# Patient Record
Sex: FEMALE | Race: BLACK OR AFRICAN AMERICAN | NOT HISPANIC OR LATINO | URBAN - METROPOLITAN AREA
[De-identification: names, ages, dates, MRNs, and addresses within clinical notes are randomized per-mention and may not be internally consistent; named-entity substitution may affect disease eponyms.]

---

## 2018-01-11 ENCOUNTER — EMERGENCY (EMERGENCY)
Facility: HOSPITAL | Age: 23
LOS: 1 days | Discharge: HOME | End: 2018-01-11
Attending: EMERGENCY MEDICINE | Admitting: EMERGENCY MEDICINE
Payer: SELF-PAY

## 2018-01-11 VITALS
OXYGEN SATURATION: 100 % | RESPIRATION RATE: 21 BRPM | DIASTOLIC BLOOD PRESSURE: 77 MMHG | TEMPERATURE: 100 F | SYSTOLIC BLOOD PRESSURE: 125 MMHG | HEART RATE: 125 BPM

## 2018-01-11 VITALS
RESPIRATION RATE: 18 BRPM | OXYGEN SATURATION: 100 % | DIASTOLIC BLOOD PRESSURE: 64 MMHG | SYSTOLIC BLOOD PRESSURE: 112 MMHG | HEART RATE: 110 BPM

## 2018-01-11 LAB
ALBUMIN SERPL ELPH-MCNC: 4.4 G/DL — SIGNIFICANT CHANGE UP (ref 3.3–5)
ALP SERPL-CCNC: 88 U/L — SIGNIFICANT CHANGE UP (ref 40–120)
ALT FLD-CCNC: 20 U/L — SIGNIFICANT CHANGE UP (ref 4–33)
ANISOCYTOSIS BLD QL: SLIGHT — SIGNIFICANT CHANGE UP
AST SERPL-CCNC: 21 U/L — SIGNIFICANT CHANGE UP (ref 4–32)
BASOPHILS # BLD AUTO: 0.02 K/UL — SIGNIFICANT CHANGE UP (ref 0–0.2)
BASOPHILS NFR BLD AUTO: 0.3 % — SIGNIFICANT CHANGE UP (ref 0–2)
BASOPHILS NFR SPEC: 0 % — SIGNIFICANT CHANGE UP (ref 0–2)
BILIRUB SERPL-MCNC: 0.4 MG/DL — SIGNIFICANT CHANGE UP (ref 0.2–1.2)
BUN SERPL-MCNC: 10 MG/DL — SIGNIFICANT CHANGE UP (ref 7–23)
CALCIUM SERPL-MCNC: 9.4 MG/DL — SIGNIFICANT CHANGE UP (ref 8.4–10.5)
CHLORIDE SERPL-SCNC: 98 MMOL/L — SIGNIFICANT CHANGE UP (ref 98–107)
CO2 SERPL-SCNC: 22 MMOL/L — SIGNIFICANT CHANGE UP (ref 22–31)
CREAT SERPL-MCNC: 0.98 MG/DL — SIGNIFICANT CHANGE UP (ref 0.5–1.3)
EOSINOPHIL # BLD AUTO: 0 K/UL — SIGNIFICANT CHANGE UP (ref 0–0.5)
EOSINOPHIL NFR BLD AUTO: 0 % — SIGNIFICANT CHANGE UP (ref 0–6)
EOSINOPHIL NFR FLD: 0 % — SIGNIFICANT CHANGE UP (ref 0–6)
GIANT PLATELETS BLD QL SMEAR: PRESENT — SIGNIFICANT CHANGE UP
GLUCOSE SERPL-MCNC: 88 MG/DL — SIGNIFICANT CHANGE UP (ref 70–99)
HCT VFR BLD CALC: 31.7 % — LOW (ref 34.5–45)
HGB BLD-MCNC: 10.9 G/DL — LOW (ref 11.5–15.5)
IMM GRANULOCYTES # BLD AUTO: 0.02 # — SIGNIFICANT CHANGE UP
IMM GRANULOCYTES NFR BLD AUTO: 0.3 % — SIGNIFICANT CHANGE UP (ref 0–1.5)
LYMPHOCYTES # BLD AUTO: 0.57 K/UL — LOW (ref 1–3.3)
LYMPHOCYTES # BLD AUTO: 7.2 % — LOW (ref 13–44)
LYMPHOCYTES NFR SPEC AUTO: 6.1 % — LOW (ref 13–44)
MCHC RBC-ENTMCNC: 24.2 PG — LOW (ref 27–34)
MCHC RBC-ENTMCNC: 34.4 % — SIGNIFICANT CHANGE UP (ref 32–36)
MCV RBC AUTO: 70.3 FL — LOW (ref 80–100)
MICROCYTES BLD QL: SIGNIFICANT CHANGE UP
MONOCYTES # BLD AUTO: 0.96 K/UL — HIGH (ref 0–0.9)
MONOCYTES NFR BLD AUTO: 12.2 % — SIGNIFICANT CHANGE UP (ref 2–14)
MONOCYTES NFR BLD: 8.8 % — SIGNIFICANT CHANGE UP (ref 2–9)
NEUTROPHIL AB SER-ACNC: 84.2 % — HIGH (ref 43–77)
NEUTROPHILS # BLD AUTO: 6.33 K/UL — SIGNIFICANT CHANGE UP (ref 1.8–7.4)
NEUTROPHILS NFR BLD AUTO: 80 % — HIGH (ref 43–77)
NEUTS BAND # BLD: 0.9 % — SIGNIFICANT CHANGE UP (ref 0–6)
NRBC # FLD: 0 — SIGNIFICANT CHANGE UP
PLATELET # BLD AUTO: 328 K/UL — SIGNIFICANT CHANGE UP (ref 150–400)
PLATELET COUNT - ESTIMATE: NORMAL — SIGNIFICANT CHANGE UP
PMV BLD: 9.9 FL — SIGNIFICANT CHANGE UP (ref 7–13)
POIKILOCYTOSIS BLD QL AUTO: SLIGHT — SIGNIFICANT CHANGE UP
POTASSIUM SERPL-MCNC: 3.9 MMOL/L — SIGNIFICANT CHANGE UP (ref 3.5–5.3)
POTASSIUM SERPL-SCNC: 3.9 MMOL/L — SIGNIFICANT CHANGE UP (ref 3.5–5.3)
PROT SERPL-MCNC: 8.5 G/DL — HIGH (ref 6–8.3)
RBC # BLD: 4.51 M/UL — SIGNIFICANT CHANGE UP (ref 3.8–5.2)
RBC # FLD: 15.8 % — HIGH (ref 10.3–14.5)
REVIEW TO FOLLOW: YES — SIGNIFICANT CHANGE UP
SODIUM SERPL-SCNC: 136 MMOL/L — SIGNIFICANT CHANGE UP (ref 135–145)
TARGETS BLD QL SMEAR: SIGNIFICANT CHANGE UP
WBC # BLD: 7.9 K/UL — SIGNIFICANT CHANGE UP (ref 3.8–10.5)
WBC # FLD AUTO: 7.9 K/UL — SIGNIFICANT CHANGE UP (ref 3.8–10.5)

## 2018-01-11 PROCEDURE — 99284 EMERGENCY DEPT VISIT MOD MDM: CPT

## 2018-01-11 RX ORDER — KETOROLAC TROMETHAMINE 30 MG/ML
30 SYRINGE (ML) INJECTION ONCE
Qty: 0 | Refills: 0 | Status: DISCONTINUED | OUTPATIENT
Start: 2018-01-11 | End: 2018-01-11

## 2018-01-11 RX ORDER — SODIUM CHLORIDE 9 MG/ML
1000 INJECTION INTRAMUSCULAR; INTRAVENOUS; SUBCUTANEOUS ONCE
Qty: 0 | Refills: 0 | Status: COMPLETED | OUTPATIENT
Start: 2018-01-11 | End: 2018-01-11

## 2018-01-11 RX ORDER — ACETAMINOPHEN 500 MG
1000 TABLET ORAL ONCE
Qty: 0 | Refills: 0 | Status: COMPLETED | OUTPATIENT
Start: 2018-01-11 | End: 2018-01-11

## 2018-01-11 RX ADMIN — SODIUM CHLORIDE 1000 MILLILITER(S): 9 INJECTION INTRAMUSCULAR; INTRAVENOUS; SUBCUTANEOUS at 11:00

## 2018-01-11 RX ADMIN — Medication 400 MILLIGRAM(S): at 11:00

## 2018-01-11 RX ADMIN — Medication 30 MILLIGRAM(S): at 12:38

## 2018-01-11 NOTE — ED PROVIDER NOTE - CARE PLAN
Principal Discharge DX:	Viral illness  Instructions for follow-up, activity and diet:	1) Please follow-up with your primary care doctor within the next week if symptoms persist.  If you cannot follow-up with your doctor(s), please return to the ED for any urgent issues.  2) If you have any worsening of symptoms or any other concerns please return to the ED immediately.  3) Please continue taking your home medications as directed.

## 2018-01-11 NOTE — ED PROVIDER NOTE - OBJECTIVE STATEMENT
22F no pmhx here c/o body aches, subj f/c, fatigue/weakness since this AM in setting of URI sx yesterday. 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 She did not receive a flu shot this year. Pt says she had a fever to 100.3 at home (same here). Pt says she has also had intermittent nausea but no vomiting. Otherwise pt denies cp, ap/n/v/d, urinary sx. No recent travel.

## 2018-01-11 NOTE — ED PROVIDER NOTE - MEDICAL DECISION MAKING DETAILS
22 otherwise healthy female here w/ malaise in setting of uri sx. No cp. No flu shot this year. Here tachy to 110 w/ temp of 100.3, likely flu/viral. Exam non-focal. Plan for fluids, basics, and antipyretics.

## 2018-01-11 NOTE — ED ADULT NURSE NOTE - OBJECTIVE STATEMENT
Pt c/o URI-symptoms since last night with body aches, fevers, chills today. C/o nausea, denies vomiting/ diarrhea/ abd pain/ dysuria/ cough/ other acute complaints at this time.

## 2018-01-11 NOTE — ED PROVIDER NOTE - PLAN OF CARE
1) Please follow-up with your primary care doctor within the next week if symptoms persist.  If you cannot follow-up with your doctor(s), please return to the ED for any urgent issues.  2) If you have any worsening of symptoms or any other concerns please return to the ED immediately.  3) Please continue taking your home medications as directed.

## 2018-01-11 NOTE — ED ADULT NURSE NOTE - CHIEF COMPLAINT QUOTE
Pt c/o chills, fever, SOB, sore throat, nasal and chest congestion since last night worsening this AM.

## 2018-01-11 NOTE — ED PROVIDER NOTE - ATTENDING CONTRIBUTION TO CARE
Seen and examined, normal pharynx, clear lungs, heart reg, soft, NT abd, no CVAT, mild paraspinal tenderness, no edema, NT calves. Likely viral syndrome, fever ctrl, IVF, reassess.

## 2018-04-29 ENCOUNTER — INPATIENT (INPATIENT)
Facility: HOSPITAL | Age: 23
LOS: 0 days | Discharge: HOME | DRG: 552 | End: 2018-04-30
Attending: SURGERY | Admitting: SURGERY
Payer: COMMERCIAL

## 2018-04-29 VITALS
RESPIRATION RATE: 14 BRPM | OXYGEN SATURATION: 100 % | DIASTOLIC BLOOD PRESSURE: 94 MMHG | HEART RATE: 94 BPM | TEMPERATURE: 98 F | SYSTOLIC BLOOD PRESSURE: 155 MMHG

## 2018-04-29 DIAGNOSIS — M54.2 CERVICALGIA: ICD-10-CM

## 2018-04-29 LAB
ALBUMIN SERPL ELPH-MCNC: 4.5 G/DL — SIGNIFICANT CHANGE UP (ref 3.3–5)
ALP SERPL-CCNC: 83 U/L — SIGNIFICANT CHANGE UP (ref 40–120)
ALT FLD-CCNC: 23 U/L — SIGNIFICANT CHANGE UP (ref 10–45)
ANION GAP SERPL CALC-SCNC: 19 MMOL/L — HIGH (ref 5–17)
APPEARANCE UR: CLEAR — SIGNIFICANT CHANGE UP
APTT BLD: 32.2 SEC — SIGNIFICANT CHANGE UP (ref 27.5–37.4)
AST SERPL-CCNC: 26 U/L — SIGNIFICANT CHANGE UP (ref 10–40)
BASOPHILS # BLD AUTO: 0.1 K/UL — SIGNIFICANT CHANGE UP (ref 0–0.2)
BASOPHILS NFR BLD AUTO: 1 % — SIGNIFICANT CHANGE UP (ref 0–2)
BILIRUB SERPL-MCNC: 0.2 MG/DL — SIGNIFICANT CHANGE UP (ref 0.2–1.2)
BILIRUB UR-MCNC: NEGATIVE — SIGNIFICANT CHANGE UP
BLD GP AB SCN SERPL QL: NEGATIVE — SIGNIFICANT CHANGE UP
BUN SERPL-MCNC: 12 MG/DL — SIGNIFICANT CHANGE UP (ref 7–23)
CALCIUM SERPL-MCNC: 9.7 MG/DL — SIGNIFICANT CHANGE UP (ref 8.4–10.5)
CHLORIDE SERPL-SCNC: 101 MMOL/L — SIGNIFICANT CHANGE UP (ref 96–108)
CO2 SERPL-SCNC: 23 MMOL/L — SIGNIFICANT CHANGE UP (ref 22–31)
COLOR SPEC: SIGNIFICANT CHANGE UP
CREAT SERPL-MCNC: 1.08 MG/DL — SIGNIFICANT CHANGE UP (ref 0.5–1.3)
DIFF PNL FLD: NEGATIVE — SIGNIFICANT CHANGE UP
EOSINOPHIL # BLD AUTO: 0 K/UL — SIGNIFICANT CHANGE UP (ref 0–0.5)
EOSINOPHIL NFR BLD AUTO: 0.1 % — SIGNIFICANT CHANGE UP (ref 0–6)
ETHANOL SERPL-MCNC: 182 MG/DL — HIGH (ref 0–10)
GAS PNL BLDV: SIGNIFICANT CHANGE UP
GLUCOSE SERPL-MCNC: 109 MG/DL — HIGH (ref 70–99)
GLUCOSE UR QL: NEGATIVE — SIGNIFICANT CHANGE UP
HCG SERPL-ACNC: <2 MIU/ML — LOW (ref 5–24)
HCT VFR BLD CALC: 32.6 % — LOW (ref 34.5–45)
HGB BLD-MCNC: 11.3 G/DL — LOW (ref 11.5–15.5)
INR BLD: 1.18 RATIO — HIGH (ref 0.88–1.16)
KETONES UR-MCNC: NEGATIVE — SIGNIFICANT CHANGE UP
LEUKOCYTE ESTERASE UR-ACNC: NEGATIVE — SIGNIFICANT CHANGE UP
LYMPHOCYTES # BLD AUTO: 2.4 K/UL — SIGNIFICANT CHANGE UP (ref 1–3.3)
LYMPHOCYTES # BLD AUTO: 35.6 % — SIGNIFICANT CHANGE UP (ref 13–44)
MCHC RBC-ENTMCNC: 25.3 PG — LOW (ref 27–34)
MCHC RBC-ENTMCNC: 34.6 GM/DL — SIGNIFICANT CHANGE UP (ref 32–36)
MCV RBC AUTO: 73.2 FL — LOW (ref 80–100)
MONOCYTES # BLD AUTO: 0.8 K/UL — SIGNIFICANT CHANGE UP (ref 0–0.9)
MONOCYTES NFR BLD AUTO: 11.8 % — SIGNIFICANT CHANGE UP (ref 2–14)
NEUTROPHILS # BLD AUTO: 3.5 K/UL — SIGNIFICANT CHANGE UP (ref 1.8–7.4)
NEUTROPHILS NFR BLD AUTO: 51.5 % — SIGNIFICANT CHANGE UP (ref 43–77)
NITRITE UR-MCNC: NEGATIVE — SIGNIFICANT CHANGE UP
PH UR: 6 — SIGNIFICANT CHANGE UP (ref 5–8)
PLATELET # BLD AUTO: 376 K/UL — SIGNIFICANT CHANGE UP (ref 150–400)
POTASSIUM SERPL-MCNC: 3.8 MMOL/L — SIGNIFICANT CHANGE UP (ref 3.5–5.3)
POTASSIUM SERPL-SCNC: 3.8 MMOL/L — SIGNIFICANT CHANGE UP (ref 3.5–5.3)
PROT SERPL-MCNC: 8.8 G/DL — HIGH (ref 6–8.3)
PROT UR-MCNC: NEGATIVE — SIGNIFICANT CHANGE UP
PROTHROM AB SERPL-ACNC: 12.9 SEC — HIGH (ref 9.8–12.7)
RBC # BLD: 4.45 M/UL — SIGNIFICANT CHANGE UP (ref 3.8–5.2)
RBC # FLD: 14.6 % — HIGH (ref 10.3–14.5)
RH IG SCN BLD-IMP: POSITIVE — SIGNIFICANT CHANGE UP
SODIUM SERPL-SCNC: 143 MMOL/L — SIGNIFICANT CHANGE UP (ref 135–145)
SP GR SPEC: >1.03 — HIGH (ref 1.01–1.02)
UROBILINOGEN FLD QL: NEGATIVE — SIGNIFICANT CHANGE UP
WBC # BLD: 6.8 K/UL — SIGNIFICANT CHANGE UP (ref 3.8–10.5)
WBC # FLD AUTO: 6.8 K/UL — SIGNIFICANT CHANGE UP (ref 3.8–10.5)

## 2018-04-29 PROCEDURE — 99285 EMERGENCY DEPT VISIT HI MDM: CPT | Mod: 25

## 2018-04-29 PROCEDURE — 71260 CT THORAX DX C+: CPT | Mod: 26

## 2018-04-29 PROCEDURE — 71045 X-RAY EXAM CHEST 1 VIEW: CPT | Mod: 26

## 2018-04-29 PROCEDURE — 72128 CT CHEST SPINE W/O DYE: CPT | Mod: 26

## 2018-04-29 PROCEDURE — 70450 CT HEAD/BRAIN W/O DYE: CPT | Mod: 26

## 2018-04-29 PROCEDURE — 72131 CT LUMBAR SPINE W/O DYE: CPT | Mod: 26

## 2018-04-29 PROCEDURE — 72125 CT NECK SPINE W/O DYE: CPT | Mod: 26

## 2018-04-29 PROCEDURE — 99053 MED SERV 10PM-8AM 24 HR FAC: CPT

## 2018-04-29 PROCEDURE — 99221 1ST HOSP IP/OBS SF/LOW 40: CPT

## 2018-04-29 PROCEDURE — 74177 CT ABD & PELVIS W/CONTRAST: CPT | Mod: 26

## 2018-04-29 PROCEDURE — 72141 MRI NECK SPINE W/O DYE: CPT | Mod: 26

## 2018-04-29 RX ORDER — OXYCODONE AND ACETAMINOPHEN 5; 325 MG/1; MG/1
1 TABLET ORAL EVERY 4 HOURS
Qty: 0 | Refills: 0 | Status: DISCONTINUED | OUTPATIENT
Start: 2018-04-29 | End: 2018-04-30

## 2018-04-29 RX ORDER — ACETAMINOPHEN 500 MG
1000 TABLET ORAL ONCE
Qty: 0 | Refills: 0 | Status: COMPLETED | OUTPATIENT
Start: 2018-04-29 | End: 2018-04-29

## 2018-04-29 RX ORDER — TETANUS TOXOID, REDUCED DIPHTHERIA TOXOID AND ACELLULAR PERTUSSIS VACCINE, ADSORBED 5; 2.5; 8; 8; 2.5 [IU]/.5ML; [IU]/.5ML; UG/.5ML; UG/.5ML; UG/.5ML
0.5 SUSPENSION INTRAMUSCULAR ONCE
Qty: 0 | Refills: 0 | Status: COMPLETED | OUTPATIENT
Start: 2018-04-29 | End: 2018-04-29

## 2018-04-29 RX ORDER — KETOROLAC TROMETHAMINE 30 MG/ML
15 SYRINGE (ML) INJECTION ONCE
Qty: 0 | Refills: 0 | Status: DISCONTINUED | OUTPATIENT
Start: 2018-04-29 | End: 2018-04-29

## 2018-04-29 RX ORDER — SODIUM CHLORIDE 9 MG/ML
1000 INJECTION INTRAMUSCULAR; INTRAVENOUS; SUBCUTANEOUS ONCE
Qty: 0 | Refills: 0 | Status: COMPLETED | OUTPATIENT
Start: 2018-04-29 | End: 2018-04-29

## 2018-04-29 RX ORDER — ONDANSETRON 8 MG/1
4 TABLET, FILM COATED ORAL ONCE
Qty: 0 | Refills: 0 | Status: COMPLETED | OUTPATIENT
Start: 2018-04-29 | End: 2018-04-29

## 2018-04-29 RX ORDER — ENOXAPARIN SODIUM 100 MG/ML
40 INJECTION SUBCUTANEOUS EVERY 24 HOURS
Qty: 0 | Refills: 0 | Status: DISCONTINUED | OUTPATIENT
Start: 2018-04-29 | End: 2018-04-30

## 2018-04-29 RX ORDER — FENTANYL CITRATE 50 UG/ML
25 INJECTION INTRAVENOUS ONCE
Qty: 0 | Refills: 0 | Status: DISCONTINUED | OUTPATIENT
Start: 2018-04-29 | End: 2018-04-29

## 2018-04-29 RX ORDER — IBUPROFEN 200 MG
400 TABLET ORAL EVERY 6 HOURS
Qty: 0 | Refills: 0 | Status: DISCONTINUED | OUTPATIENT
Start: 2018-04-29 | End: 2018-04-30

## 2018-04-29 RX ADMIN — ONDANSETRON 4 MILLIGRAM(S): 8 TABLET, FILM COATED ORAL at 09:18

## 2018-04-29 RX ADMIN — FENTANYL CITRATE 25 MICROGRAM(S): 50 INJECTION INTRAVENOUS at 09:40

## 2018-04-29 RX ADMIN — SODIUM CHLORIDE 1000 MILLILITER(S): 9 INJECTION INTRAMUSCULAR; INTRAVENOUS; SUBCUTANEOUS at 08:07

## 2018-04-29 RX ADMIN — FENTANYL CITRATE 25 MICROGRAM(S): 50 INJECTION INTRAVENOUS at 08:07

## 2018-04-29 RX ADMIN — Medication 15 MILLIGRAM(S): at 10:58

## 2018-04-29 RX ADMIN — Medication 1000 MILLIGRAM(S): at 11:34

## 2018-04-29 RX ADMIN — Medication 15 MILLIGRAM(S): at 11:34

## 2018-04-29 RX ADMIN — Medication 400 MILLIGRAM(S): at 18:59

## 2018-04-29 RX ADMIN — Medication 400 MILLIGRAM(S): at 19:29

## 2018-04-29 RX ADMIN — Medication 400 MILLIGRAM(S): at 10:12

## 2018-04-29 NOTE — H&P ADULT - ATTENDING COMMENTS
I have reviewed the history, pertinent labs and imaging, and discussed the care with the consult resident.    C/o midline neck tenderness. Abdomen soft, nontender, motor and sensation intact in all 4 extremities.    Plan  admit to [facility]  mri cspine - continue c-collar pending results  spine consult  substance abuse counseling
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
## 2018-04-29 NOTE — H&P ADULT - NSHPLABSRESULTS_GEN_ALL_CORE
11.3   6.8   )-----------( 376      ( 29 Apr 2018 07:31 )             32.6   04-29    143  |  101  |  12  ----------------------------<  109<H>  3.8   |  23  |  1.08    Ca    9.7      29 Apr 2018 07:31    TPro  8.8<H>  /  Alb  4.5  /  TBili  0.2  /  DBili  x   /  AST  26  /  ALT  23  /  AlkPhos  83  04-29    < from: CT Lumbar Spine Reform No Cont (04.29.18 @ 09:43) >    INTERPRETATION:HISTORY: Trauma.    COMPARISON: None     FINDINGS:     CT BRAIN:     There is no acute intracranial mass-effect, hemorrhage, midline shift, or   abnormal extra-axial fluid collection.     Ventricles, sulci, and cisterns are normal in size for the patient's age   without evidence of hydrocephalus. Basal cisterns are patent.     Paranasal sinuses and mastoid air cells are clear. The calvarium is   intact.     CT CERVICAL SPINE:     Straightening of the cervical lordosisdue to muscle spasm and/or   positioning.     There is no prevertebral soft tissue swelling. Vertebral body heights and   alignment are maintained without compression deformity or subluxation.     The atlantodental and atlanto-occipital joints are maintained. Articular   facets and posterior elements alignment is maintained.     The partially imaged lung apices are clear.     CT LUMBAR SPINE  CT THORACIC SPINE    Bones: No acute fracture. No destructive lesion.  Alignment: No traumatic subluxation.   Degenerative changes: No critical spinal stenosis.  Paraspinal soft tissues: No paravertebral soft tissue swelling. No   suspicious lesion. Normal aortic size. No large lung mass or   consolidation within the field of view.    IMPRESSION:    CT BRAIN: No acute intracranial bleeding, mass effect, or shift.     CT CERVICAL/THORACIC/LUMBAR SPINE: No fracture or subluxation.   Straightened lordosis.     No acute compression deformity in the thoracic or lumbar spines.        < from: CT Abdomen and Pelvis w/ IV Cont (04.29.18 @ 08:32) >  FINDINGS:  CHEST:   LUNGS AND LARGE AIRWAYS: Patent central airways. No pulmonary nodules.  PLEURA: No pleural effusion.  VESSELS: Within normal limits.  HEART: Heart size is normal. No pericardial effusion.  MEDIASTINUM AND SANG: No lymphadenopathy.  CHEST WALL AND LOWER NECK: Within normal limits.  ABDOMEN AND PELVIS:  LIVER: Within normal limits.  BILE DUCTS: Normal caliber.  GALLBLADDER: Within normal limits.  SPLEEN: Within normal limits.  PANCREAS: Within normal limits.  ADRENALS: Within normal limits.  KIDNEYS/URETERS: Symmetric enhancement without hydronephrosis.  BLADDER: Within normal limits.  REPRODUCTIVE ORGANS: Unremarkable uterus andadnexa.  BOWEL: No bowel obstruction. Appendix is normal.  PERITONEUM: No ascites.  VESSELS:  Within normal limits.  RETROPERITONEUM: No lymphadenopathy.    ABDOMINAL WALL: Within normal limits.  BONES: Within normal limits.  IMPRESSION:   No acute sequela of trauma in the chest, abdomen and pelvis.
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
## 2018-04-29 NOTE — H&P ADULT - NSHPPHYSICALEXAM_GEN_ALL_CORE
ICU Vital Signs Last 24 Hrs  T(C): 36.5 (29 Apr 2018 10:16), Max: 36.6 (29 Apr 2018 07:18)  T(F): 97.7 (29 Apr 2018 10:16), Max: 97.8 (29 Apr 2018 07:18)  HR: 74 (29 Apr 2018 10:16) (74 - 97)  BP: 120/79 (29 Apr 2018 10:16) (120/79 - 155/94)  BP(mean): --  ABP: --  ABP(mean): --  RR: 18 (29 Apr 2018 10:16) (14 - 18)  SpO2: 96% (29 Apr 2018 10:16) (96% - 100%)    General:  Sitting up in bed, appears comfortable  HEENT:  Right forehead abrasion, no active bleeding.  Cervical collar in place.  Tenderness to palpation along cervical spine, no stepoffs  Chest:  Breath sounds audible bilaterally; no wheezing, rales or ronchi  Abdomen:  Soft, nontender, nondistended  Pelvis:  Stable  Extremities:  Warm, well perfused; pulses intact bilaterally

## 2018-04-29 NOTE — ED PROVIDER NOTE - PHYSICAL EXAMINATION
Lynn Miles M.D.:   patient awake alert seen lying on stretcher in c-collar, mild distress 2/2 back, neck pain.   LUNGS CTAB no wheeze no crackle.   CARD RRR no m/r/g.    Abdomen soft NT ND no rebound no guarding no CVA tenderness.   EXT WWP no edema no calf tenderness CV 2+DP/PT bilaterally. +midlien c/t/l spine w/o stepoffs or deformities.   rectal tone intact  neuro A&Ox2 (person, place, not tiem)cn 2-12 intact gross motor and sensory intact.    skin warm and dry minor abrasions above right eyebrow and lateral to right eye and on posterior right shoulder.  HEENT: moist mucous membranes, PERRL, EOMI Lynn Miles M.D.:   patient awake alert seen lying on stretcher in c-collar, mild distress 2/2 back, neck pain.   LUNGS CTAB no wheeze no crackle.   CARD RRR no m/r/g.    Abdomen soft NT ND no rebound no guarding no CVA tenderness.   EXT WWP no edema no calf tenderness CV 2+DP/PT bilaterally. +midlien c/t/l spine w/o stepoffs or deformities.  pelvis stable  rectal tone intact  neuro A&Ox2 (person, place, not tiem)cn 2-12 intact gross motor and sensory intact.    skin warm and dry minor abrasions above right eyebrow and lateral to right eye and on posterior right shoulder.  HEENT: moist mucous membranes, PERRL, EOMI pupils 4-5mm and sluggish.
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
## 2018-04-30 ENCOUNTER — TRANSCRIPTION ENCOUNTER (OUTPATIENT)
Age: 23
End: 2018-04-30

## 2018-04-30 VITALS
DIASTOLIC BLOOD PRESSURE: 80 MMHG | TEMPERATURE: 98 F | SYSTOLIC BLOOD PRESSURE: 120 MMHG | OXYGEN SATURATION: 100 % | RESPIRATION RATE: 18 BRPM | HEART RATE: 69 BPM

## 2018-04-30 LAB
ANION GAP SERPL CALC-SCNC: 10 MMOL/L — SIGNIFICANT CHANGE UP (ref 5–17)
BUN SERPL-MCNC: 13 MG/DL — SIGNIFICANT CHANGE UP (ref 7–23)
CALCIUM SERPL-MCNC: 9.6 MG/DL — SIGNIFICANT CHANGE UP (ref 8.4–10.5)
CHLORIDE SERPL-SCNC: 104 MMOL/L — SIGNIFICANT CHANGE UP (ref 96–108)
CO2 SERPL-SCNC: 28 MMOL/L — SIGNIFICANT CHANGE UP (ref 22–31)
CREAT SERPL-MCNC: 1.12 MG/DL — SIGNIFICANT CHANGE UP (ref 0.5–1.3)
GLUCOSE SERPL-MCNC: 86 MG/DL — SIGNIFICANT CHANGE UP (ref 70–99)
HCT VFR BLD CALC: 30.2 % — LOW (ref 34.5–45)
HGB BLD-MCNC: 10.3 G/DL — LOW (ref 11.5–15.5)
MAGNESIUM SERPL-MCNC: 2.1 MG/DL — SIGNIFICANT CHANGE UP (ref 1.6–2.6)
MCHC RBC-ENTMCNC: 25.2 PG — LOW (ref 27–34)
MCHC RBC-ENTMCNC: 34 GM/DL — SIGNIFICANT CHANGE UP (ref 32–36)
MCV RBC AUTO: 73.9 FL — LOW (ref 80–100)
PHOSPHATE SERPL-MCNC: 4.4 MG/DL — SIGNIFICANT CHANGE UP (ref 2.5–4.5)
PLATELET # BLD AUTO: 348 K/UL — SIGNIFICANT CHANGE UP (ref 150–400)
POTASSIUM SERPL-MCNC: 3.3 MMOL/L — LOW (ref 3.5–5.3)
POTASSIUM SERPL-SCNC: 3.3 MMOL/L — LOW (ref 3.5–5.3)
RBC # BLD: 4.08 M/UL — SIGNIFICANT CHANGE UP (ref 3.8–5.2)
RBC # FLD: 14.8 % — HIGH (ref 10.3–14.5)
SODIUM SERPL-SCNC: 142 MMOL/L — SIGNIFICANT CHANGE UP (ref 135–145)
WBC # BLD: 5.6 K/UL — SIGNIFICANT CHANGE UP (ref 3.8–10.5)
WBC # FLD AUTO: 5.6 K/UL — SIGNIFICANT CHANGE UP (ref 3.8–10.5)

## 2018-04-30 PROCEDURE — 82947 ASSAY GLUCOSE BLOOD QUANT: CPT

## 2018-04-30 PROCEDURE — 85730 THROMBOPLASTIN TIME PARTIAL: CPT

## 2018-04-30 PROCEDURE — 80307 DRUG TEST PRSMV CHEM ANLYZR: CPT

## 2018-04-30 PROCEDURE — 81003 URINALYSIS AUTO W/O SCOPE: CPT

## 2018-04-30 PROCEDURE — 90471 IMMUNIZATION ADMIN: CPT

## 2018-04-30 PROCEDURE — 82435 ASSAY OF BLOOD CHLORIDE: CPT

## 2018-04-30 PROCEDURE — 83605 ASSAY OF LACTIC ACID: CPT

## 2018-04-30 PROCEDURE — 82330 ASSAY OF CALCIUM: CPT

## 2018-04-30 PROCEDURE — 72125 CT NECK SPINE W/O DYE: CPT

## 2018-04-30 PROCEDURE — 84702 CHORIONIC GONADOTROPIN TEST: CPT

## 2018-04-30 PROCEDURE — 86850 RBC ANTIBODY SCREEN: CPT

## 2018-04-30 PROCEDURE — 84295 ASSAY OF SERUM SODIUM: CPT

## 2018-04-30 PROCEDURE — 99291 CRITICAL CARE FIRST HOUR: CPT

## 2018-04-30 PROCEDURE — 72141 MRI NECK SPINE W/O DYE: CPT

## 2018-04-30 PROCEDURE — 82803 BLOOD GASES ANY COMBINATION: CPT

## 2018-04-30 PROCEDURE — 84132 ASSAY OF SERUM POTASSIUM: CPT

## 2018-04-30 PROCEDURE — 96376 TX/PRO/DX INJ SAME DRUG ADON: CPT

## 2018-04-30 PROCEDURE — 83735 ASSAY OF MAGNESIUM: CPT

## 2018-04-30 PROCEDURE — 71045 X-RAY EXAM CHEST 1 VIEW: CPT

## 2018-04-30 PROCEDURE — 96375 TX/PRO/DX INJ NEW DRUG ADDON: CPT | Mod: XU

## 2018-04-30 PROCEDURE — 80048 BASIC METABOLIC PNL TOTAL CA: CPT

## 2018-04-30 PROCEDURE — 99285 EMERGENCY DEPT VISIT HI MDM: CPT | Mod: 25

## 2018-04-30 PROCEDURE — 85014 HEMATOCRIT: CPT

## 2018-04-30 PROCEDURE — 80053 COMPREHEN METABOLIC PANEL: CPT

## 2018-04-30 PROCEDURE — 84100 ASSAY OF PHOSPHORUS: CPT

## 2018-04-30 PROCEDURE — 86901 BLOOD TYPING SEROLOGIC RH(D): CPT

## 2018-04-30 PROCEDURE — 85027 COMPLETE CBC AUTOMATED: CPT

## 2018-04-30 PROCEDURE — 85610 PROTHROMBIN TIME: CPT

## 2018-04-30 PROCEDURE — 70450 CT HEAD/BRAIN W/O DYE: CPT

## 2018-04-30 PROCEDURE — 86900 BLOOD TYPING SEROLOGIC ABO: CPT

## 2018-04-30 PROCEDURE — 71260 CT THORAX DX C+: CPT

## 2018-04-30 PROCEDURE — 74177 CT ABD & PELVIS W/CONTRAST: CPT

## 2018-04-30 PROCEDURE — 96374 THER/PROPH/DIAG INJ IV PUSH: CPT | Mod: XU

## 2018-04-30 RX ORDER — DOCUSATE SODIUM 100 MG
1 CAPSULE ORAL
Qty: 0 | Refills: 0 | COMMUNITY
Start: 2018-04-30

## 2018-04-30 RX ORDER — IBUPROFEN 200 MG
1 TABLET ORAL
Qty: 0 | Refills: 0 | COMMUNITY
Start: 2018-04-30

## 2018-04-30 RX ORDER — ACETAMINOPHEN 500 MG
2 TABLET ORAL
Qty: 0 | Refills: 0 | COMMUNITY

## 2018-04-30 RX ORDER — SENNA PLUS 8.6 MG/1
2 TABLET ORAL
Qty: 0 | Refills: 0 | COMMUNITY
Start: 2018-04-30

## 2018-04-30 RX ORDER — POTASSIUM CHLORIDE 20 MEQ
40 PACKET (EA) ORAL EVERY 4 HOURS
Qty: 0 | Refills: 0 | Status: DISCONTINUED | OUTPATIENT
Start: 2018-04-30 | End: 2018-04-30

## 2018-04-30 RX ORDER — SENNA PLUS 8.6 MG/1
2 TABLET ORAL AT BEDTIME
Qty: 0 | Refills: 0 | Status: DISCONTINUED | OUTPATIENT
Start: 2018-04-30 | End: 2018-04-30

## 2018-04-30 RX ORDER — DOCUSATE SODIUM 100 MG
100 CAPSULE ORAL THREE TIMES A DAY
Qty: 0 | Refills: 0 | Status: DISCONTINUED | OUTPATIENT
Start: 2018-04-30 | End: 2018-04-30

## 2018-04-30 RX ORDER — OXYCODONE HYDROCHLORIDE 5 MG/1
1 TABLET ORAL
Qty: 35 | Refills: 0 | OUTPATIENT
Start: 2018-04-30 | End: 2018-05-06

## 2018-04-30 RX ADMIN — ENOXAPARIN SODIUM 40 MILLIGRAM(S): 100 INJECTION SUBCUTANEOUS at 12:40

## 2018-04-30 RX ADMIN — OXYCODONE AND ACETAMINOPHEN 1 TABLET(S): 5; 325 TABLET ORAL at 00:10

## 2018-04-30 RX ADMIN — OXYCODONE AND ACETAMINOPHEN 1 TABLET(S): 5; 325 TABLET ORAL at 12:40

## 2018-04-30 RX ADMIN — Medication 40 MILLIEQUIVALENT(S): at 12:40

## 2018-04-30 RX ADMIN — OXYCODONE AND ACETAMINOPHEN 1 TABLET(S): 5; 325 TABLET ORAL at 00:34
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
## 2018-04-30 NOTE — PROGRESS NOTE ADULT - SUBJECTIVE AND OBJECTIVE BOX
ACS Progress Note    S: Patient seen and examined. No acute events overnight. Pain well controlled with current regimen, much improved from admission.    O:  Vital Signs Last 24 Hrs  T(C): 37.2 (30 Apr 2018 09:05), Max: 37.2 (30 Apr 2018 09:05)  T(F): 99 (30 Apr 2018 09:05), Max: 99 (30 Apr 2018 09:05)  HR: 73 (30 Apr 2018 09:05) (69 - 75)  BP: 114/73 (30 Apr 2018 09:05) (112/69 - 126/77)  RR: 18 (30 Apr 2018 09:05) (18 - 20)  SpO2: 100% (30 Apr 2018 09:05) (96% - 100%)    I&O's Detail    29 Apr 2018 07:01  -  30 Apr 2018 07:00  --------------------------------------------------------  IN:    Oral Fluid: 600 mL  Total IN: 600 mL    OUT:    Voided: 500 mL  Total OUT: 500 mL    Total NET: 100 mL    30 Apr 2018 07:01  -  30 Apr 2018 09:56  --------------------------------------------------------  IN:    Oral Fluid: 800 mL  Total IN: 800 mL    OUT:  Total OUT: 0 mL    Total NET: 800 mL    MEDICATIONS  (STANDING):  docusate sodium 100 milliGRAM(s) Oral three times a day  enoxaparin Injectable 40 milliGRAM(s) SubCutaneous every 24 hours  potassium chloride    Tablet ER 40 milliEquivalent(s) Oral every 4 hours  senna 2 Tablet(s) Oral at bedtime    MEDICATIONS  (PRN):  ibuprofen  Tablet 400 milliGRAM(s) Oral every 6 hours PRN Mild Pain  oxyCODONE    5 mG/acetaminophen 325 mG 1 Tablet(s) Oral every 4 hours PRN Moderate Pain                        10.3   5.6   )-----------( 348      ( 30 Apr 2018 06:41 )             30.2     04-30    142  |  104  |  13  ----------------------------<  86  3.3<L>   |  28  |  1.12    Ca    9.6      30 Apr 2018 06:41  Phos  4.4     04-30  Mg     2.1     04-30    TPro  8.8<H>  /  Alb  4.5  /  TBili  0.2  /  DBili  x   /  AST  26  /  ALT  23  /  AlkPhos  83  04-29    Physical Exam:  Gen: Laying in bed, NAD, alert and oriented.   HEENT: in c-collar, no posterior midline TTP currently  Resp: Unlabored breathing  Abd: soft, NT, ND    Lines:   IV: patent, in place.
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
## 2018-04-30 NOTE — DISCHARGE NOTE ADULT - CARE PROVIDER_API CALL
Stephany Prince), Surgery  [address] [place] [address]  Phone: [phone]  Fax: [phone] Stephany Prince), Surgery  [id number] [place] [address]  Phone: [phone]  Fax: [phone]    Anglea Robert), [facility] Neurosurgery  General  [address] [place] [address]  Phone: [phone]  Fax: [phone]

## 2018-04-30 NOTE — DISCHARGE NOTE ADULT - MEDICATION SUMMARY - MEDICATIONS TO TAKE
I will START or STAY ON the medications listed below when I get home from the hospital:    ibuprofen 400 mg oral tablet  -- 1 tab(s) by mouth every 6 hours, As needed, Mild Pain  -- Indication: For Mild pain    oxyCODONE 5 mg oral tablet  -- 1-2  tab(s) by mouth every 4-6 hours, As Needed MDD:8  -- Caution federal law prohibits the transfer of this drug to any person other  than the person for whom it was prescribed.  It is very important that you take or use this exactly as directed.  Do not skip doses or discontinue unless directed by your doctor.  May cause drowsiness.  Alcohol may intensify this effect.  Use care when operating dangerous machinery.  This prescription cannot be refilled.  Using more of this medication than prescribed may cause serious breathing problems.    -- Indication: For severe pain    Tylenol 500 mg oral tablet  -- 2 tab(s) by mouth every 6 hours  -- Indication: For mod pain    senna oral tablet  -- 2 tab(s) by mouth once a day (at bedtime)  -- Indication: For stool softner    docusate sodium 100 mg oral capsule  -- 1 cap(s) by mouth 3 times a day  -- Indication: For stool softner

## 2018-04-30 NOTE — DISCHARGE NOTE ADULT - PATIENT PORTAL LINK FT
You can access the [email or web address], offered by [facility], by registering with the following website: [email or web address]
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
## 2018-04-30 NOTE — DISCHARGE NOTE ADULT - PLAN OF CARE
You may Maintain c collar for comfort Your PMD--Please call for a follow up appointment upon discharge regarding your recent hospitalization. Your PMD--Please call for a follow up appointment upon discharge regarding your recent hospitalization. You may also follow up with Dr Robert from Neuro spine, if you have any questions.

## 2018-04-30 NOTE — DISCHARGE NOTE ADULT - CARE PROVIDERS DIRECT ADDRESSES
,DirectAddress_Unknown ,DirectAddress_Unknown,[email or web address]

## 2018-04-30 NOTE — DISCHARGE NOTE ADULT - CARE PLAN
Principal Discharge DX:	Neck pain  Goal:	You may Maintain c collar for comfort  Assessment and plan of treatment:	Your PMD--Please call for a follow up appointment upon discharge regarding your recent hospitalization. Principal Discharge DX:	Neck pain  Goal:	You may Maintain c collar for comfort  Assessment and plan of treatment:	Your PMD--Please call for a follow up appointment upon discharge regarding your recent hospitalization. You may also follow up with Dr Robert from Neuro spine, if you have any questions.

## 2018-08-08 ENCOUNTER — EMERGENCY (EMERGENCY)
Facility: HOSPITAL | Age: 23
LOS: 1 days | Discharge: HOME | End: 2018-08-08
Attending: EMERGENCY MEDICINE | Admitting: EMERGENCY MEDICINE
Payer: SELF-PAY

## 2018-08-08 VITALS
RESPIRATION RATE: 16 BRPM | SYSTOLIC BLOOD PRESSURE: 115 MMHG | HEART RATE: 79 BPM | OXYGEN SATURATION: 98 % | TEMPERATURE: 98 F | DIASTOLIC BLOOD PRESSURE: 81 MMHG

## 2018-08-08 PROCEDURE — 99283 EMERGENCY DEPT VISIT LOW MDM: CPT

## 2018-08-08 NOTE — ED ADULT TRIAGE NOTE - CHIEF COMPLAINT QUOTE
pt c/o abdominal pain x "couple days" and breakouts at abdomen and arms. endorses nausea but denies vomiting or diarrhea. breakouts appear dark purple with surrounding redness, pt denies injury and reports they are itchy. appears comfortable

## 2018-08-09 LAB
APPEARANCE UR: SIGNIFICANT CHANGE UP
BACTERIA # UR AUTO: SIGNIFICANT CHANGE UP
BILIRUB UR-MCNC: NEGATIVE — SIGNIFICANT CHANGE UP
BLOOD UR QL VISUAL: NEGATIVE — SIGNIFICANT CHANGE UP
COLOR SPEC: YELLOW — SIGNIFICANT CHANGE UP
GLUCOSE UR-MCNC: NEGATIVE — SIGNIFICANT CHANGE UP
KETONES UR-MCNC: NEGATIVE — SIGNIFICANT CHANGE UP
LEUKOCYTE ESTERASE UR-ACNC: HIGH
MUCOUS THREADS # UR AUTO: SIGNIFICANT CHANGE UP
NITRITE UR-MCNC: NEGATIVE — SIGNIFICANT CHANGE UP
PH UR: 6 — SIGNIFICANT CHANGE UP (ref 4.6–8)
PROT UR-MCNC: 20 MG/DL — SIGNIFICANT CHANGE UP
RBC CASTS # UR COMP ASSIST: SIGNIFICANT CHANGE UP (ref 0–?)
SP GR SPEC: 1.02 — SIGNIFICANT CHANGE UP (ref 1–1.04)
SQUAMOUS # UR AUTO: SIGNIFICANT CHANGE UP
UROBILINOGEN FLD QL: NORMAL MG/DL — SIGNIFICANT CHANGE UP
WBC UR QL: SIGNIFICANT CHANGE UP (ref 0–?)

## 2018-08-09 RX ORDER — DIPHENHYDRAMINE HCL 50 MG
50 CAPSULE ORAL ONCE
Qty: 0 | Refills: 0 | Status: COMPLETED | OUTPATIENT
Start: 2018-08-09 | End: 2018-08-09

## 2018-08-09 RX ORDER — ACETAMINOPHEN 500 MG
975 TABLET ORAL ONCE
Qty: 0 | Refills: 0 | Status: COMPLETED | OUTPATIENT
Start: 2018-08-09 | End: 2018-08-09

## 2018-08-09 RX ADMIN — Medication 50 MILLIGRAM(S): at 01:02

## 2018-08-09 RX ADMIN — Medication 1 APPLICATION(S): at 01:02

## 2018-08-09 NOTE — ED PROVIDER NOTE - OBJECTIVE STATEMENT
23 female otherwise healthy here for rash and abdominal pain. Onset of abdominal pain few days ago, sharp, points to whole abdomen, intermittent, moderate intensity. +nausea but no emesis, no hematochezia, no melena, no changes in urination or bowel/bladder habits. Never happened before. No vaginal discharge. LMP July 11th. Also complaining of rash on abdomen and upper arm, pruritic, no fevers, no sick contacts, no hiking in woods, no recent travel.

## 2018-08-09 NOTE — ED PROVIDER NOTE - PROGRESS NOTE DETAILS
DOREEN العلي:  Pt medically stable for discharge. Pt to follow up with PMD and dermatology (referral list provided).

## 2018-08-09 NOTE — ED PROVIDER NOTE - PHYSICAL EXAMINATION
Gen: NAD, AOx3, non-toxic //            Head: NCAT //            HEENT: EOMI, oral mucosa moist, normal conjunctiva //            Lung: CTAB, no respiratory distress, no wheezes/rhonchi/rales B/L, speaking in full sentences. //            CV: RRR, no murmurs, rubs or gallops //            Abd: soft, suprapubic tenerness, no guarding, no CVA tenderness, normal bimanual gynecological exam//            MSK: no visible deformities //            Neuro: No focal sensory or motor deficits //            Skin: Warm, well perfused, 3 cm in largest diameter rash that is elliptoid with hyperpigmented center that is nonblanching and a paler, erythematous rim that is pruritic, blanching and palpable. A smaller 1 cm rash of similar qualities on upper arm on right.  //            Psych: normal affect. ~Anna Chen M.D., Ph.D. -Resident

## 2018-08-09 NOTE — ED PROVIDER NOTE - MEDICAL DECISION MAKING DETAILS
suprapubic pain, most likely 2/2 UTI, will do UA and culture. Low suspicion for ovarian or uterine pathology. Rash is most likely tinea, will treat. Dispo pending workup. suprapubic pain, most likely 2/2 UTI, will do UA and culture. Low suspicion for ovarian or uterine pathology. dispo pending UA.  Rash is most likely tinea, will treat. Dispo pending workup.

## 2018-08-09 NOTE — ED PROVIDER NOTE - NS ED ROS FT
GENERAL: No fever or chills, //             EYES: no change in vision, //             HEENT: no trouble swallowing or speaking, //             CARDIAC: no chest pain, //              PULMONARY: no cough or SOB, //                      : No changes in urination,  //                  NEURO: no headache,  //             MSK: No joint pain otherwise as HPI or negative. ~Anna Chen M.D., Ph.D. -Resident

## 2018-08-09 NOTE — ED PROVIDER NOTE - ATTENDING CONTRIBUTION TO CARE
Dr. Estrada: 24 yo female with no sig PMH in ED with rash to right arm and abdomen, as well as mild generalized abdominal pain.  No urinary complaints.  Rash to right arm and left abdomen began a few days ago, appears ovoid but has been enlarging in size and is itchy.  No fevers, skin discharge, CP/SOB.  Has nausea but no vomiting or diarrhea.  On exam pt well appearing, in NAD, heart RRR, lungs CTAB, abd NTND, extremities without swelling, strength 5/5 in all extremities.  Right arm and left abdomen each with oval lesion--dark center with palpable leading ring surrounding--no erythema or obvious cellulitis.  Clinically appears to be possible tinea corporis so will treat for such.  Abdominal exam benign at time of my exam, low suspicion for acute intra-abdominal process.

## 2018-08-09 NOTE — ED PROVIDER NOTE - PLAN OF CARE
You were seen in the ER for abdominal pain and a rash. You must follow up with your primary physician in 24 to 48 hours. Return to the ER for any new or worsening signs/symptoms. See handout for additional reasons to return to the ER.   1) Take the cream as prescribed on the rash area. You were seen in the ER for abdominal pain and a rash. You must follow up with your primary physician in 24 to 48 hours. Return to the ER for any new or worsening signs/symptoms. See handout for additional reasons to return to the ER.   1) Use the cream on the site of rash as prescribed.

## 2018-08-09 NOTE — ED PROVIDER NOTE - CARE PLAN
Principal Discharge DX:	Pelvic pain in female  Assessment and plan of treatment:	You were seen in the ER for abdominal pain and a rash. You must follow up with your primary physician in 24 to 48 hours. Return to the ER for any new or worsening signs/symptoms. See handout for additional reasons to return to the ER.   1) Take the cream as prescribed on the rash area.  Secondary Diagnosis:	Tinea corporis Principal Discharge DX:	Pelvic pain in female  Assessment and plan of treatment:	You were seen in the ER for abdominal pain and a rash. You must follow up with your primary physician in 24 to 48 hours. Return to the ER for any new or worsening signs/symptoms. See handout for additional reasons to return to the ER.   1) Use the cream on the site of rash as prescribed.  Secondary Diagnosis:	Tinea corporis

## 2018-08-10 LAB
BACTERIA UR CULT: SIGNIFICANT CHANGE UP
SPECIMEN SOURCE: SIGNIFICANT CHANGE UP

## 2023-01-08 NOTE — ED ADULT TRIAGE NOTE - CHIEF COMPLAINT QUOTE
Pt c/o chills, fever, SOB, sore throat, nasal and chest congestion since last night worsening this AM.
no pain, swelling or deformity of joints

## 2024-01-09 ENCOUNTER — NON-APPOINTMENT (OUTPATIENT)
Age: 29
End: 2024-01-09

## 2024-01-26 NOTE — ED ADULT NURSE REASSESSMENT NOTE - NS ED NURSE REASSESS COMMENT FT1
"[facility] MEDICINE SERVICE  DAILY PROGRESS NOTE      Patient Name: Bassam Cedeno  Date of Admission: 1/22/2024  Today's Date: 01/26/24  Length of Stay: 3  Primary Care Physician: Nitza Salas APRN    Subjective   Patient states he is not feeling well today.  He feels weaker, and is having generalized body aches, including abdominal and chest pain.  He notes that he continues to have a cough and some nausea, and just feels \"rough\".  No other complaints.  No overnight events.      Objective    Temp:  [97.2 °F (36.2 °C)-98.4 °F (36.9 °C)] 97.9 °F (36.6 °C)  Heart Rate:  [74-88] 82  Resp:  [5-23] 23  BP: (106-136)/(54-79) 114/73  Physical Exam  General: NAD, thin appearance  HEENT: AT NC, EOMI  Neck: No JVD  CVS: S1/S2 present, RRR, systolic murmur 2/6  Lungs: coarse bilaterally  Abdomen: soft, NT, ND, BS+  Ext: no edema  Skin: no rashes, bruises or discolorations  Neuro: no focal deficits  Psych: Not agitated         Results Review:  I have reviewed the labs, radiology results, and diagnostic studies.    Laboratory Data:   Results from last 7 days   Lab Units 01/26/24 0443 01/23/24  0420 01/22/24  2150   WBC 10*3/mm3 10.20 12.20* 11.40*   HEMOGLOBIN g/dL 12.8* 13.1 13.4   HEMATOCRIT % 39.6 39.6 40.5   PLATELETS 10*3/mm3 165 139* 169        Results from last 7 days   Lab Units 01/26/24  0443 01/23/24  0420 01/22/24  2321   SODIUM mmol/L 144 141 142   POTASSIUM mmol/L 3.0* 4.0 4.0   CHLORIDE mmol/L 102 105 105   CO2 mmol/L 31.0* 24.0 25.0   BUN mg/dL 41* 34* 35*   CREATININE mg/dL 1.80* 1.78* 1.81*   CALCIUM mg/dL 8.8 8.7 8.8   BILIRUBIN mg/dL  --   --  0.5   ALK PHOS U/L  --   --  51   ALT (SGPT) U/L  --   --  13   AST (SGOT) U/L  --   --  16   GLUCOSE mg/dL 84 121* 95       Culture Data:   No results found for: \"BLOODCX\"    No results found for: \"URINECX\"  No results found for: \"RESPCX\"    No results found for: \"WOUNDCX\"  No results found for: \"STOOLCX\"  No components found for: \"BODYFLD\"    Radiology " Data:   Imaging Results (Last 24 Hours)       Procedure Component Value Units Date/Time    XR Chest 1 View [[id number]] Collected: 01/26/24 0917     Updated: 01/26/24 0947    Narrative:      XR CHEST 1 VW    Date of Exam: 1/26/2024 9:00 AM EST    Indication: CHF/PNA    Comparison: 1/22/2024    Findings:  Sternotomy wires again overlie the midline. There is hyperinflation suggesting emphysema. There is increasing opacity in the bases bilaterally suggesting small pleural effusions with overlying atelectasis or infiltrate 1.5 cm right basilar nodular   density again noted. Please see PET scan report 11/30/2023. Pulmonary vascularity appears less congested on today's study. Heart size and mediastinal contour appear within normal limits. No pneumothorax identified.      Impression:      Impression:    1. Increasing bibasilar opacities suggesting small amounts of pleural fluid with overlying infiltrate or atelectasis.  2. Decreasing central pulmonary vascular congestion.    Electronically Signed: Koby Hart MD    1/26/2024 9:20 AM EST    Workstation ID: [id number]            I have reviewed the patient's current medications.     Assessment/Plan       1) acute on chronic respiratory failure with hypoxia  - secondary to influenza and COPD exacerbation  - duonebs  - patient is on room air at this time    2) influenza A  - tamiflu  - supportive care  - procal is WNL    3) HTN  - home meds    4) HLD  - lipitor    5) CAD  - home meds    6) BPH  - finasteride    7) GI/DVT ppx  - none/lovenox        Electronically signed by José Miguel Seaman MD, 01/26/24, 09:57 EST.     pt. able to ambulate to the bathroom w/o dizziness/lightheadedness. pt. assisted w/ family member and reports feeling okay to walk. pt. able to ambulate to the bathroom w/o dizziness/lightheadedness. pt. assisted w/ family member and reports feeling okay to walk. Pt. also reports improvement in pain.

## 2024-08-25 ENCOUNTER — INPATIENT (INPATIENT)
Facility: HOSPITAL | Age: 29
LOS: 1 days | Discharge: HOME | End: 2024-08-27
Attending: STUDENT IN AN ORGANIZED HEALTH CARE EDUCATION/TRAINING PROGRAM | Admitting: STUDENT IN AN ORGANIZED HEALTH CARE EDUCATION/TRAINING PROGRAM
Payer: MEDICAID

## 2024-08-25 VITALS
SYSTOLIC BLOOD PRESSURE: 107 MMHG | HEART RATE: 81 BPM | RESPIRATION RATE: 18 BRPM | WEIGHT: 225.09 LBS | DIASTOLIC BLOOD PRESSURE: 71 MMHG | OXYGEN SATURATION: 100 % | TEMPERATURE: 99 F

## 2024-08-25 DIAGNOSIS — K52.9 NONINFECTIVE GASTROENTERITIS AND COLITIS, UNSPECIFIED: ICD-10-CM

## 2024-08-25 DIAGNOSIS — D64.9 ANEMIA, UNSPECIFIED: ICD-10-CM

## 2024-08-25 DIAGNOSIS — Z29.9 ENCOUNTER FOR PROPHYLACTIC MEASURES, UNSPECIFIED: ICD-10-CM

## 2024-08-25 DIAGNOSIS — K51.00 ULCERATIVE (CHRONIC) PANCOLITIS WITHOUT COMPLICATIONS: ICD-10-CM

## 2024-08-25 LAB
ALBUMIN SERPL ELPH-MCNC: 4.1 G/DL — SIGNIFICANT CHANGE UP (ref 3.3–5)
ALP SERPL-CCNC: 79 U/L — SIGNIFICANT CHANGE UP (ref 40–120)
ALT FLD-CCNC: 14 U/L — SIGNIFICANT CHANGE UP (ref 4–33)
ANION GAP SERPL CALC-SCNC: 16 MMOL/L — HIGH (ref 7–14)
AST SERPL-CCNC: 17 U/L — SIGNIFICANT CHANGE UP (ref 4–32)
BASOPHILS # BLD AUTO: 0 K/UL — SIGNIFICANT CHANGE UP (ref 0–0.2)
BASOPHILS NFR BLD AUTO: 0 % — SIGNIFICANT CHANGE UP (ref 0–2)
BILIRUB SERPL-MCNC: 0.3 MG/DL — SIGNIFICANT CHANGE UP (ref 0.2–1.2)
BLOOD GAS VENOUS COMPREHENSIVE RESULT: SIGNIFICANT CHANGE UP
BUN SERPL-MCNC: 10 MG/DL — SIGNIFICANT CHANGE UP (ref 7–23)
C DIFF GDH STL QL: NEGATIVE — SIGNIFICANT CHANGE UP
C DIFF GDH STL QL: SIGNIFICANT CHANGE UP
CALCIUM SERPL-MCNC: 8.9 MG/DL — SIGNIFICANT CHANGE UP (ref 8.4–10.5)
CHLORIDE SERPL-SCNC: 99 MMOL/L — SIGNIFICANT CHANGE UP (ref 98–107)
CO2 SERPL-SCNC: 21 MMOL/L — LOW (ref 22–31)
CREAT SERPL-MCNC: 0.82 MG/DL — SIGNIFICANT CHANGE UP (ref 0.5–1.3)
EGFR: 100 ML/MIN/1.73M2 — SIGNIFICANT CHANGE UP
EOSINOPHIL # BLD AUTO: 0 K/UL — SIGNIFICANT CHANGE UP (ref 0–0.5)
EOSINOPHIL NFR BLD AUTO: 0 % — SIGNIFICANT CHANGE UP (ref 0–6)
FLUAV AG NPH QL: SIGNIFICANT CHANGE UP
FLUBV AG NPH QL: SIGNIFICANT CHANGE UP
GLUCOSE SERPL-MCNC: 94 MG/DL — SIGNIFICANT CHANGE UP (ref 70–99)
HCG SERPL-ACNC: <1 MIU/ML — SIGNIFICANT CHANGE UP
HCT VFR BLD CALC: 32.9 % — LOW (ref 34.5–45)
HGB BLD-MCNC: 11.4 G/DL — LOW (ref 11.5–15.5)
IANC: 3.99 K/UL — SIGNIFICANT CHANGE UP (ref 1.8–7.4)
LIDOCAIN IGE QN: 46 U/L — SIGNIFICANT CHANGE UP (ref 7–60)
LYMPHOCYTES # BLD AUTO: 0.45 K/UL — LOW (ref 1–3.3)
LYMPHOCYTES # BLD AUTO: 8 % — LOW (ref 13–44)
MAGNESIUM SERPL-MCNC: 1.8 MG/DL — SIGNIFICANT CHANGE UP (ref 1.6–2.6)
MCHC RBC-ENTMCNC: 24.4 PG — LOW (ref 27–34)
MCHC RBC-ENTMCNC: 34.7 GM/DL — SIGNIFICANT CHANGE UP (ref 32–36)
MCV RBC AUTO: 70.3 FL — LOW (ref 80–100)
MONOCYTES # BLD AUTO: 0.65 K/UL — SIGNIFICANT CHANGE UP (ref 0–0.9)
MONOCYTES NFR BLD AUTO: 11.6 % — SIGNIFICANT CHANGE UP (ref 2–14)
NEUTROPHILS # BLD AUTO: 4.24 K/UL — SIGNIFICANT CHANGE UP (ref 1.8–7.4)
NEUTROPHILS NFR BLD AUTO: 58 % — SIGNIFICANT CHANGE UP (ref 43–77)
PHOSPHATE SERPL-MCNC: 2.9 MG/DL — SIGNIFICANT CHANGE UP (ref 2.5–4.5)
PLATELET # BLD AUTO: 321 K/UL — SIGNIFICANT CHANGE UP (ref 150–400)
POTASSIUM SERPL-MCNC: 3.4 MMOL/L — LOW (ref 3.5–5.3)
POTASSIUM SERPL-SCNC: 3.4 MMOL/L — LOW (ref 3.5–5.3)
PROT SERPL-MCNC: 8.3 G/DL — SIGNIFICANT CHANGE UP (ref 6–8.3)
RBC # BLD: 4.68 M/UL — SIGNIFICANT CHANGE UP (ref 3.8–5.2)
RBC # FLD: 15.8 % — HIGH (ref 10.3–14.5)
RSV RNA NPH QL NAA+NON-PROBE: SIGNIFICANT CHANGE UP
SARS-COV-2 RNA SPEC QL NAA+PROBE: SIGNIFICANT CHANGE UP
SODIUM SERPL-SCNC: 136 MMOL/L — SIGNIFICANT CHANGE UP (ref 135–145)
WBC # BLD: 5.59 K/UL — SIGNIFICANT CHANGE UP (ref 3.8–10.5)
WBC # FLD AUTO: 5.59 K/UL — SIGNIFICANT CHANGE UP (ref 3.8–10.5)

## 2024-08-25 PROCEDURE — 99285 EMERGENCY DEPT VISIT HI MDM: CPT

## 2024-08-25 PROCEDURE — 99223 1ST HOSP IP/OBS HIGH 75: CPT

## 2024-08-25 PROCEDURE — 74177 CT ABD & PELVIS W/CONTRAST: CPT | Mod: 26,MC

## 2024-08-25 RX ORDER — TIZANIDINE 4 MG/1
2 TABLET ORAL ONCE
Refills: 0 | Status: COMPLETED | OUTPATIENT
Start: 2024-08-25 | End: 2024-08-25

## 2024-08-25 RX ORDER — ONDANSETRON 2 MG/ML
4 INJECTION, SOLUTION INTRAMUSCULAR; INTRAVENOUS EVERY 8 HOURS
Refills: 0 | Status: DISCONTINUED | OUTPATIENT
Start: 2024-08-25 | End: 2024-08-27

## 2024-08-25 RX ORDER — CHLORHEXIDINE GLUCONATE 40 MG/ML
1 SOLUTION TOPICAL DAILY
Refills: 0 | Status: DISCONTINUED | OUTPATIENT
Start: 2024-08-25 | End: 2024-08-27

## 2024-08-25 RX ORDER — MAGNESIUM, ALUMINUM HYDROXIDE 200-225/5
30 SUSPENSION, ORAL (FINAL DOSE FORM) ORAL EVERY 4 HOURS
Refills: 0 | Status: DISCONTINUED | OUTPATIENT
Start: 2024-08-25 | End: 2024-08-27

## 2024-08-25 RX ORDER — ACETAMINOPHEN 325 MG/1
1000 TABLET ORAL ONCE
Refills: 0 | Status: COMPLETED | OUTPATIENT
Start: 2024-08-25 | End: 2024-08-25

## 2024-08-25 RX ORDER — ONDANSETRON 2 MG/ML
4 INJECTION, SOLUTION INTRAMUSCULAR; INTRAVENOUS ONCE
Refills: 0 | Status: COMPLETED | OUTPATIENT
Start: 2024-08-25 | End: 2024-08-25

## 2024-08-25 RX ORDER — SODIUM CHLORIDE 9 MG/ML
1000 INJECTION INTRAMUSCULAR; INTRAVENOUS; SUBCUTANEOUS ONCE
Refills: 0 | Status: COMPLETED | OUTPATIENT
Start: 2024-08-25 | End: 2024-08-25

## 2024-08-25 RX ORDER — ENOXAPARIN SODIUM 100 MG/ML
40 INJECTION SUBCUTANEOUS EVERY 24 HOURS
Refills: 0 | Status: DISCONTINUED | OUTPATIENT
Start: 2024-08-25 | End: 2024-08-26

## 2024-08-25 RX ORDER — PIPERACILLIN SODIUM AND TAZOBACTAM SODIUM 3; .375 G/15ML; G/15ML
3.38 INJECTION, POWDER, FOR SOLUTION INTRAVENOUS ONCE
Refills: 0 | Status: COMPLETED | OUTPATIENT
Start: 2024-08-25 | End: 2024-08-25

## 2024-08-25 RX ORDER — PIPERACILLIN SODIUM AND TAZOBACTAM SODIUM 3; .375 G/15ML; G/15ML
3.38 INJECTION, POWDER, FOR SOLUTION INTRAVENOUS EVERY 8 HOURS
Refills: 0 | Status: DISCONTINUED | OUTPATIENT
Start: 2024-08-25 | End: 2024-08-25

## 2024-08-25 RX ORDER — ACETAMINOPHEN 325 MG/1
650 TABLET ORAL EVERY 6 HOURS
Refills: 0 | Status: DISCONTINUED | OUTPATIENT
Start: 2024-08-25 | End: 2024-08-27

## 2024-08-25 RX ORDER — POTASSIUM CHLORIDE 10 MEQ
40 TABLET, EXT RELEASE, PARTICLES/CRYSTALS ORAL EVERY 4 HOURS
Refills: 0 | Status: COMPLETED | OUTPATIENT
Start: 2024-08-25 | End: 2024-08-25

## 2024-08-25 RX ADMIN — PIPERACILLIN SODIUM AND TAZOBACTAM SODIUM 200 GRAM(S): 3; .375 INJECTION, POWDER, FOR SOLUTION INTRAVENOUS at 12:24

## 2024-08-25 RX ADMIN — TIZANIDINE 2 MILLIGRAM(S): 4 TABLET ORAL at 23:30

## 2024-08-25 RX ADMIN — ONDANSETRON 4 MILLIGRAM(S): 2 INJECTION, SOLUTION INTRAMUSCULAR; INTRAVENOUS at 11:01

## 2024-08-25 RX ADMIN — Medication 100 MILLILITER(S): at 15:36

## 2024-08-25 RX ADMIN — ACETAMINOPHEN 400 MILLIGRAM(S): 325 TABLET ORAL at 10:58

## 2024-08-25 RX ADMIN — SODIUM CHLORIDE 1000 MILLILITER(S): 9 INJECTION INTRAMUSCULAR; INTRAVENOUS; SUBCUTANEOUS at 10:58

## 2024-08-25 RX ADMIN — ENOXAPARIN SODIUM 40 MILLIGRAM(S): 100 INJECTION SUBCUTANEOUS at 17:56

## 2024-08-25 RX ADMIN — Medication 40 MILLIEQUIVALENT(S): at 17:55

## 2024-08-25 RX ADMIN — Medication 40 MILLIEQUIVALENT(S): at 22:13

## 2024-08-25 NOTE — H&P ADULT - ASSESSMENT
Pt is a 29 year old female with no significant past medical history presenting with watery diarrhea and abdominal pain associated with myalgias likely in the setting of viral or bacterial gastroenteritis. She is hemodynamically stable and afebrile with labs notable for bandemia and CT findings notable for pancolitis.

## 2024-08-25 NOTE — ED PROVIDER NOTE - ATTENDING CONTRIBUTION TO CARE
Braden Lee DO:  patient seen and evaluated with the resident.  I was present for key portions of the History & Physical, and I agree with the Impression & Plan. 30 yo f No reported PMH, no PSH, PW abdominal pain and diarrhea.  PW family bedside who provides collateral.  Reports 3 days of symptoms.  Reports unknown inciting event.  Denies recent travel, sick contacts.  Reports first day, had 10 episodes of diarrhea daily, now improving.  Has noticed mild blood in stool.  Denies F/C.  Reports diffuse abdominal cramping, worse in epigastrium and left lower quadrant.  Patient arrives HDS, well-appearing, neurovascular intact, PE as noted.  Eval for viral syndrome versus colitis.  Labs, imaging, reassess. Braden Lee DO:  patient seen and evaluated with the resident.  I was present for key portions of the History & Physical, and I agree with the Impression & Plan. 28 yo f No reported PMH, no PSH, PW abdominal pain and diarrhea.  PW family bedside who provides collateral.  Reports 3 days of symptoms.  Reports unknown inciting event.  Denies recent travel, sick contacts.  Reports first day, had 10 episodes of diarrhea daily, now improving.  Has noticed mild blood in stool.  Denies F/C.  Reports diffuse abdominal cramping, worse in epigastrium and left lower quadrant.  Patient arrives HDS, well-appearing, neurovascular intact, PE as noted.  Eval for viral syndrome versus colitis.  Labs, imaging, reassess..

## 2024-08-25 NOTE — H&P ADULT - NSHPLABSRESULTS_GEN_ALL_CORE
LABS:                         11.4   5.59  )-----------( 321      ( 25 Aug 2024 11:05 )             32.9     08-25    136  |  99  |  10  ----------------------------<  94  3.4<L>   |  21<L>  |  0.82    Ca    8.9      25 Aug 2024 11:05  Phos  2.9     08-25  Mg     1.80     08-25    TPro  8.3  /  Alb  4.1  /  TBili  0.3  /  DBili  x   /  AST  17  /  ALT  14  /  AlkPhos  79  08-25      Urinalysis Basic - ( 25 Aug 2024 11:05 )    Color: x / Appearance: x / SG: x / pH: x  Gluc: 94 mg/dL / Ketone: x  / Bili: x / Urobili: x   Blood: x / Protein: x / Nitrite: x   Leuk Esterase: x / RBC: x / WBC x   Sq Epi: x / Non Sq Epi: x / Bacteria: x                RADIOLOGY, EKG & ADDITIONAL TESTS:

## 2024-08-25 NOTE — H&P ADULT - ATTENDING COMMENTS
29F with diarrhea  --f/u stool studies  --supportive care  --if CDiff neg can give antimotility agents

## 2024-08-25 NOTE — H&P ADULT - HISTORY OF PRESENT ILLNESS
29 year old female with no past medical history presenting with watery diarrhea and abdominal pain associated with chills and myalgias for the past 3 days. Her diarrhea started after eating Chinese food. She did not heat up fried rice. She has had 10 episodes of watery diarrhea on Friday along with one episode of dark red bloody stool. Pt does have nausea and diffuse abdominal cramping. She denies fevers, sick contacts, recent travel, recent changes in medications, dysuria, and vomiting.     In the ED, pt was hemodynamically stable, well-appearing and neurovascularly intact. Labs and a CT abd/pelvis was obtained, which showed diffuse abnormal colonic wall thickening, particularly severe in the descending and sigmoid colon, findings compatible with a pancolitis. This is likely of inflammatory or infectious etiology. Stool cultures, blood cultures, GI PCR pending.   29 year old female with no past medical history presenting with watery diarrhea and abdominal pain associated with myalgias for the past 3 days. Nothing like this has happened before. Her diarrhea started on Thursday after eating Chinese food. She did not heat up fried rice. She had 2 episodes of diarrhea Thursday night, 10 episodes of watery diarrhea on Friday along with one episode of dark red bloody stool. The diarrhea has become slightly less frequent but is still associated with a sharp pain diffusely right before she feels the need to defecate. The pain is intermittent and she rates it as an 9/10 in that moment. Pt does have nausea and diffuse abdominal cramping. She says she feels slightly better after defecating. She denies fevers/chills, sick contacts, recent travel, recent changes in medications, dysuria, and vomiting. She also denies pelvic pain, abnormal vaginal discharge and pain with intercourse.    Pt denies any significant medical or surgical history. She has no chronic conditions and no history of IBD. She does not take any medications and has no allergies. In the second week of July, she had a miscarriage. Her last period was on 8/15 and was regular. It lasted a couple of days. She drinks alcohol socially a few times a month and does not smoke. She denies any illicit drug use.     In the ED, pt was hemodynamically stable, well-appearing and neurovascularly intact. Labs and a CT abd/pelvis was obtained, which showed diffuse abnormal colonic wall thickening, particularly severe in the descending and sigmoid colon, findings compatible with a pancolitis. This is likely of inflammatory or infectious etiology. Stool cultures, blood cultures, GI PCR pending.

## 2024-08-25 NOTE — ED ADULT NURSE NOTE - OBJECTIVE STATEMENT
Pt. A&OX4, c/o abdominal discomfort, nausea, vomiting and diarrhea x few days. Denies fevers. #20g IV placed to right AC, labs sent as ordered. Vitals stable, breathing well on RA. Respirations even and unlabored. Will continue to monitor.

## 2024-08-25 NOTE — H&P ADULT - PROBLEM SELECTOR PLAN 1
Likely in the setting of viral or bacterial etiology. Labs notable for bandemia. CT abd/pelvis findings notable for diffuse abnormal colonic wall thickening, suggesting an inflammatory or infectious etiology. Less likely to be IBD, diverticulitis, appendicitis, gynecological etiology due to CT findings.     Plan:  - cc/hr for 12 hours  -Zosyn  -F/u blood cultures, stool cultures, GI PCR Likely in the setting of viral or bacterial etiology. Labs notable for bandemia. CT abd/pelvis findings notable for diffuse abnormal colonic wall thickening, suggesting an inflammatory or infectious etiology. Less likely to be IBD, diverticulitis, appendicitis, gynecological etiology due to CT findings.     Plan:  - cc/hr for 12 hours  -F/u blood cultures, stool cultures, GI PCR Likely in the setting of viral or bacterial etiology. Labs notable for bandemia. CT abd/pelvis findings notable for diffuse abnormal colonic wall thickening, suggesting an inflammatory or infectious etiology. Less likely to be IBD, diverticulitis, appendicitis, gynecological etiology due to CT findings.     Plan:  - cc/hr for 12 hours  -F/u blood cultures, stool cultures, GI PCR  -replete electrolytes as needed

## 2024-08-25 NOTE — ED PROVIDER NOTE - IV ALTEPLASE INCLUSION HIDDEN
CARITO CONSTANTINO is a 84yFemale , patient examined and chart reviewed. P    INTERVAL HPI/ OVERNIGHT EVENTS:   Feeling better. afebrile.  No events.    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  Osteoarthritis  Afib  PVD (peripheral vascular disease)  Neuropathy: (Right lower leg)  H/O cerebral aneurysm repair: brain clips  CVA (cerebral vascular accident): (&quot;Mini-stroke&quot;,1990&#x27;s)  Rheumatoid arthritis  Hyperlipidemia  Hypertension  S/P cataract surgery: (Left eye)  Elective surgery: (&quot;Twisted bowel&quot;, 2014)  Elective surgery: (Exision of cyst on liver, 1985)  S/P ORIF (open reduction internal fixation) fracture: Left hip fx (2012) &amp; R hip fx (2013)  H/O cerebral aneurysm repair: Brain clips (1978(  Renal stone: Cysto stent placement 10/1/2014  PVD (peripheral vascular disease): s/p RLE bypass x 3, most recent 3/2012 Right external iliac to PT bypass w/ PTFE (2012)  S/P FRED (total abdominal hysterectomy): (1987, Hx of &quot;ovarian cancer?&quot;)      For details regarding the patient's social history, family history, and other miscellaneous elements, please refer the initial infectious diseases consultation and/or the admitting history and physical examination for this admission.    ROS:  CONSTITUTIONAL:  Negative fever or chills, feels well, good appetite  EYES:  Negative  blurry vision or double vision  CARDIOVASCULAR:  Negative for chest pain or palpitations  RESPIRATORY:  Negative for cough, wheezing, or SOB   GASTROINTESTINAL:  Negative for nausea, vomiting, diarrhea, constipation, or abdominal pain  GENITOURINARY:  Negative frequency, urgency or dysuria  NEUROLOGIC:  No headache, confusion, dizziness, lightheadedness  All other systems were reviewed and are negative     Current inpatient medications :    ANTIBIOTICS/RELEVANT:  ertapenem  IVPB      ertapenem  IVPB 500 milliGRAM(s) IV Intermittent every 24 hours      acetaminophen   Tablet 650 milliGRAM(s) Oral every 8 hours PRN  aspirin enteric coated 81 milliGRAM(s) Oral daily  atorvastatin 40 milliGRAM(s) Oral at bedtime  diltiazem    milliGRAM(s) Oral daily  docusate sodium 100 milliGRAM(s) Oral three times a day  enoxaparin Injectable 50 milliGRAM(s) SubCutaneous daily  gabapentin 100 milliGRAM(s) Oral three times a day  glycerin Suppository - Adult 1 Suppository(s) Rectal daily  hydrALAZINE 50 milliGRAM(s) Oral three times a day  metoprolol succinate  milliGRAM(s) Oral daily  mirtazapine 7.5 milliGRAM(s) Oral at bedtime  pantoprazole    Tablet 40 milliGRAM(s) Oral before breakfast  polyethylene glycol 3350 17 Gram(s) Oral two times a day  senna 2 Tablet(s) Oral at bedtime  warfarin 5 milliGRAM(s) Oral once      Objective:    07-29 @ 07:01  -  07-30 @ 07:00  --------------------------------------------------------  IN: 50 mL / OUT: 0 mL / NET: 50 mL    07-30 @ 07:01  -  07-30 @ 17:06  --------------------------------------------------------  IN: 50 mL / OUT: 0 mL / NET: 50 mL      T(C): 36.4 (07-30-18 @ 04:29), Max: 36.5 (07-30-18 @ 00:20)  HR: 75 (07-30-18 @ 04:29) (75 - 80)  BP: 137/73 (07-30-18 @ 04:29) (92/60 - 157/85)  RR: 16 (07-30-18 @ 04:29) (16 - 17)  SpO2: 95% (07-30-18 @ 04:29) (95% - 95%)  Wt(kg): --      Physical Exam:  General:  no acute distress  Eyes: sclera anicteric, pupils equal and reactive to light  ENMT: buccal mucosa moist, pharynx not injected  Neck: supple, trachea midline  Lungs: clear, no wheeze/rhonchi  Cardiovascular: regular rate and rhythm, S1 S2  Abdomen: soft, nontender, no organomegaly present, bowel sounds normal  Neurological: alert and oriented x3, Cranial Nerves II-XII grossly intact  Skin: no increased ecchymosis/petechiae/purpura  Lymph Nodes: no palpable cervical/supraclavicular lymph nodes enlargements  Extremities: no cyanosis/clubbing/edema      LABS:                          9.7    7.83  )-----------( 297      ( 30 Jul 2018 10:01 )             31.3       07-30    143  |  110<H>  |  34<H>  ----------------------------<  111<H>  3.9   |  28  |  1.20    Ca    8.6      30 Jul 2018 10:01        PT/INR - ( 30 Jul 2018 10:01 )   PT: 16.4 sec;   INR: 1.49 ratio         PTT - ( 30 Jul 2018 10:01 )  PTT:33.9 sec    Assessment :  85 yo female w/ PMH of afib on coumadin, CVA and hx of aneurysm repair, HTN, HLD, mitral   valve prolapse, PVD s/p stents x 3 (RLE), 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  Appropriate use of antibiotics and adverse effects reviewed.      I have discussed the above plan of care with patient/ family in detail. They expressed understanding of the  treatment plan . Risks, benefits and alternatives discussed in detail. I have asked if they have any questions or concerns and appropriately addressed them to the best of my ability .    > 35 minutes were spent in direct patient care reviewing notes, medications ,labs data/ imaging , discussion with multidisciplinary team.    Thank you for allowing me to participate in care of your patient .    Cynthia Jaimes MD  Infectious Disease  [phone] show

## 2024-08-25 NOTE — ED PROVIDER NOTE - CLINICAL SUMMARY MEDICAL DECISION MAKING FREE TEXT BOX
30 y/o female with no pmhx presents to ED c/o diarrhea x 3 days. Started after eating chinese food. Did not heat up fried rice. Had about 10 episodes of watery diarrhea on Friday, one episode of dark red blood. Associated with chills and body aches, nausea, LLQ abd pain. pt with llq ttp on exam. likely viral syndrome although will check ct to rule out diverticulitis, labs, stool cxr, GI PCR, provide symptomatic treatment iv hydration antiemetics tylenol.

## 2024-08-25 NOTE — H&P ADULT - NSHPPHYSICALEXAM_GEN_ALL_CORE
T(C): 37.1 (08-25-24 @ 09:52), Max: 37.1 (08-25-24 @ 09:52)  HR: 81 (08-25-24 @ 09:52) (81 - 81)  BP: 107/71 (08-25-24 @ 09:52) (107/71 - 107/71)  RR: 18 (08-25-24 @ 09:52) (18 - 18)  SpO2: 100% (08-25-24 @ 09:52) (100% - 100%)    CONSTITUTIONAL: Well groomed, no apparent distress  EYES: EOMI, No conjunctival or scleral injection, non-icteric  ENMT: Oral mucosa with moist membranes. Normal dentition; no pharyngeal injection or exudates  RESP: No respiratory distress, no use of accessory muscles; CTA b/l, no WRR  CV: RRR, +S1S2, no JVD; no peripheral edema  GI: Soft, mild tenderness to palpation diffusely, negative Ellis's sign, ND, no rebound, no guarding; no palpable masses; no hepatosplenomegaly  MSK: Normal ROM without pain, normal muscle strength/tone  SKIN: No rashes or ulcers noted; no subcutaneous nodules or induration palpable  NEURO: CN II-XII intact; normal reflexes in upper and lower extremities, sensation intact in upper and lower extremities b/l to light touch   PSYCH: Appropriate insight/judgment; A+O x 3, mood and affect appropriate, recent/remote memory intact

## 2024-08-25 NOTE — H&P ADULT - NSICDXNOFAMILYHX_GEN_ALL_CORE
Hi,    Your mammogram appears most likely to be benign.  They recommend a mammogram on the left in 6 months.  I have placed the order so you can schedule that when it gets closer.  Please let me know if you have any questions or concerns.     Thanks!  Dr. Cuevas   <-- Click to add NO pertinent Family History No

## 2024-08-25 NOTE — H&P ADULT - NSHPSOCIALHISTORY_GEN_ALL_CORE
Pt works for [employer] and lives with her family. She feels safe at home and is well supported. She is sexually active with 1 partner.

## 2024-08-25 NOTE — ED PROVIDER NOTE - OBJECTIVE STATEMENT
28 y/o female with no pmhx presents to ED c/o diarrhea x 3 days. Started after eating chinese food. Did not heat up fried rice. Had about 10 episodes of watery diarrhea on Friday, one episode of dark red blood. Associated with chills and body aches, nausea, LLQ abd pain. No fever, sick contacts, recent travel or abx, dysuria, vomiting.

## 2024-08-25 NOTE — PATIENT PROFILE ADULT - FALL HARM RISK - UNIVERSAL INTERVENTIONS
Bed in lowest position, wheels locked, appropriate side rails in place/Call bell, personal items and telephone in reach/Instruct patient to call for assistance before getting out of bed or chair/Non-slip footwear when patient is out of bed/[place] to call system/Physically safe environment - no spills, clutter or unnecessary equipment/Purposeful Proactive Rounding/Room/bathroom lighting operational, light cord in reach

## 2024-08-26 ENCOUNTER — TRANSCRIPTION ENCOUNTER (OUTPATIENT)
Age: 29
End: 2024-08-26

## 2024-08-26 LAB
A1C WITH ESTIMATED AVERAGE GLUCOSE RESULT: 5.3 % — SIGNIFICANT CHANGE UP (ref 4–5.6)
ADD ON TEST-SPECIMEN IN LAB: SIGNIFICANT CHANGE UP
ANION GAP SERPL CALC-SCNC: 11 MMOL/L — SIGNIFICANT CHANGE UP (ref 7–14)
ANISOCYTOSIS BLD QL: SLIGHT — SIGNIFICANT CHANGE UP
BASOPHILS # BLD AUTO: 0 K/UL — SIGNIFICANT CHANGE UP (ref 0–0.2)
BASOPHILS NFR BLD AUTO: 0 % — SIGNIFICANT CHANGE UP (ref 0–2)
BUN SERPL-MCNC: 8 MG/DL — SIGNIFICANT CHANGE UP (ref 7–23)
CALCIUM SERPL-MCNC: 9.1 MG/DL — SIGNIFICANT CHANGE UP (ref 8.4–10.5)
CHLORIDE SERPL-SCNC: 104 MMOL/L — SIGNIFICANT CHANGE UP (ref 98–107)
CO2 SERPL-SCNC: 20 MMOL/L — LOW (ref 22–31)
CREAT SERPL-MCNC: 0.77 MG/DL — SIGNIFICANT CHANGE UP (ref 0.5–1.3)
EGFR: 108 ML/MIN/1.73M2 — SIGNIFICANT CHANGE UP
EOSINOPHIL # BLD AUTO: 0 K/UL — SIGNIFICANT CHANGE UP (ref 0–0.5)
EOSINOPHIL NFR BLD AUTO: 0 % — SIGNIFICANT CHANGE UP (ref 0–6)
ESTIMATED AVERAGE GLUCOSE: 105 — SIGNIFICANT CHANGE UP
FERRITIN SERPL-MCNC: 72 NG/ML — SIGNIFICANT CHANGE UP (ref 15–150)
GI PCR PANEL: DETECTED
GIANT PLATELETS BLD QL SMEAR: PRESENT — SIGNIFICANT CHANGE UP
GLUCOSE SERPL-MCNC: 83 MG/DL — SIGNIFICANT CHANGE UP (ref 70–99)
HCT VFR BLD CALC: 31.6 % — LOW (ref 34.5–45)
HGB BLD-MCNC: 10.9 G/DL — LOW (ref 11.5–15.5)
HYPOCHROMIA BLD QL: SIGNIFICANT CHANGE UP
IANC: 2.12 K/UL — SIGNIFICANT CHANGE UP (ref 1.8–7.4)
IRON SATN MFR SERPL: 14 UG/DL — LOW (ref 30–160)
IRON SATN MFR SERPL: 5 % — LOW (ref 14–50)
LYMPHOCYTES # BLD AUTO: 0.73 K/UL — LOW (ref 1–3.3)
LYMPHOCYTES # BLD AUTO: 16.8 % — SIGNIFICANT CHANGE UP (ref 13–44)
MAGNESIUM SERPL-MCNC: 2 MG/DL — SIGNIFICANT CHANGE UP (ref 1.6–2.6)
MANUAL SMEAR VERIFICATION: SIGNIFICANT CHANGE UP
MCHC RBC-ENTMCNC: 24.3 PG — LOW (ref 27–34)
MCHC RBC-ENTMCNC: 34.5 GM/DL — SIGNIFICANT CHANGE UP (ref 32–36)
MCV RBC AUTO: 70.4 FL — LOW (ref 80–100)
MICROCYTES BLD QL: SLIGHT — SIGNIFICANT CHANGE UP
MONOCYTES # BLD AUTO: 1.25 K/UL — HIGH (ref 0–0.9)
MONOCYTES NFR BLD AUTO: 28.7 % — HIGH (ref 2–14)
MRSA PCR RESULT.: SIGNIFICANT CHANGE UP
NEUTROPHILS # BLD AUTO: 2.15 K/UL — SIGNIFICANT CHANGE UP (ref 1.8–7.4)
NEUTROPHILS NFR BLD AUTO: 41.6 % — LOW (ref 43–77)
NEUTS BAND # BLD: 7.9 % — HIGH (ref 0–6)
PHOSPHATE SERPL-MCNC: 3.2 MG/DL — SIGNIFICANT CHANGE UP (ref 2.5–4.5)
PLAT MORPH BLD: NORMAL — SIGNIFICANT CHANGE UP
PLATELET # BLD AUTO: 300 K/UL — SIGNIFICANT CHANGE UP (ref 150–400)
PLATELET COUNT - ESTIMATE: NORMAL — SIGNIFICANT CHANGE UP
POIKILOCYTOSIS BLD QL AUTO: SLIGHT — SIGNIFICANT CHANGE UP
POLYCHROMASIA BLD QL SMEAR: SLIGHT — SIGNIFICANT CHANGE UP
POTASSIUM SERPL-MCNC: 3.8 MMOL/L — SIGNIFICANT CHANGE UP (ref 3.5–5.3)
POTASSIUM SERPL-SCNC: 3.8 MMOL/L — SIGNIFICANT CHANGE UP (ref 3.5–5.3)
RBC # BLD: 4.49 M/UL — SIGNIFICANT CHANGE UP (ref 3.8–5.2)
RBC # FLD: 16.4 % — HIGH (ref 10.3–14.5)
RBC BLD AUTO: ABNORMAL
S AUREUS DNA NOSE QL NAA+PROBE: SIGNIFICANT CHANGE UP
SALMONELLA DNA STL QL NAA+PROBE: DETECTED
SMUDGE CELLS # BLD: PRESENT — SIGNIFICANT CHANGE UP
SODIUM SERPL-SCNC: 135 MMOL/L — SIGNIFICANT CHANGE UP (ref 135–145)
TARGETS BLD QL SMEAR: SLIGHT — SIGNIFICANT CHANGE UP
TIBC SERPL-MCNC: 270 UG/DL — SIGNIFICANT CHANGE UP (ref 220–430)
UIBC SERPL-MCNC: 256 UG/DL — SIGNIFICANT CHANGE UP (ref 110–370)
VARIANT LYMPHS # BLD: 5 % — SIGNIFICANT CHANGE UP (ref 0–6)
WBC # BLD: 4.35 K/UL — SIGNIFICANT CHANGE UP (ref 3.8–10.5)
WBC # FLD AUTO: 4.35 K/UL — SIGNIFICANT CHANGE UP (ref 3.8–10.5)

## 2024-08-26 PROCEDURE — 99232 SBSQ HOSP IP/OBS MODERATE 35: CPT

## 2024-08-26 RX ORDER — ACETAMINOPHEN 325 MG/1
1000 TABLET ORAL ONCE
Refills: 0 | Status: COMPLETED | OUTPATIENT
Start: 2024-08-26 | End: 2024-08-26

## 2024-08-26 RX ADMIN — ACETAMINOPHEN 1000 MILLIGRAM(S): 325 TABLET ORAL at 06:39

## 2024-08-26 RX ADMIN — ACETAMINOPHEN 400 MILLIGRAM(S): 325 TABLET ORAL at 06:09

## 2024-08-26 RX ADMIN — CHLORHEXIDINE GLUCONATE 1 APPLICATION(S): 40 SOLUTION TOPICAL at 09:00

## 2024-08-26 NOTE — DISCHARGE NOTE PROVIDER - NSDCFUADDAPPT_GEN_ALL_CORE_FT
APPTS ARE READY TO BE MADE: [X] YES    Best Family or Patient Contact (if needed):    Additional Information about above appointments (if needed):    1: PCP for routine f/u   2:   3:     Other comments or requests:    APPTS ARE READY TO BE MADE: [X] YES    Best Family or Patient Contact (if needed):    Additional Information about above appointments (if needed):    1: PCP for routine f/u   2:   3:     Other comments or requests:         9/9/24-Patient informed us they already have secured a follow up appointment which is not visible on [facility]. on 9/12/24

## 2024-08-26 NOTE — DISCHARGE NOTE PROVIDER - HOSPITAL COURSE
HPI:  29 year old female with no past medical history presenting with watery diarrhea and abdominal pain associated with myalgias for the past 3 days. Nothing like this has happened before. Her diarrhea started on Thursday after eating Chinese food. She did not heat up fried rice. She had 2 episodes of diarrhea Thursday night, 10 episodes of watery diarrhea on Friday along with one episode of dark red bloody stool. The diarrhea has become slightly less frequent but is still associated with a sharp pain diffusely right before she feels the need to defecate. The pain is intermittent and she rates it as an 9/10 in that moment. Pt does have nausea and diffuse abdominal cramping. She says she feels slightly better after defecating. She denies fevers/chills, sick contacts, recent travel, recent changes in medications, dysuria, and vomiting. She also denies pelvic pain, abnormal vaginal discharge and pain with intercourse.    Pt denies any significant medical or surgical history. She has no chronic conditions and no history of IBD. She does not take any medications and has no allergies. In the second week of July, she had a miscarriage. Her last period was on 8/15 and was regular. It lasted a couple of days. She drinks alcohol socially a few times a month and does not smoke. She denies any illicit drug use.     In the ED, pt was hemodynamically stable, well-appearing and neurovascularly intact. Labs and a CT abd/pelvis was obtained, which showed diffuse abnormal colonic wall thickening, particularly severe in the descending and sigmoid colon, findings compatible with a pancolitis. This is likely of inflammatory or infectious etiology. Stool cultures, blood cultures, GI PCR pending.   (25 Aug 2024 13:47)    Hospital Course:    Pt is a 29 year old female with no significant past medical history presenting with watery diarrhea and abdominal pain associated with myalgias in the setting of Salmonella gastroenteritis. She is hemodynamically stable and afebrile with labs notable for bandemia and CT findings notable for pancolitis. We supported her with fluids and repleted electrolytes as needed. We advised pt to stay hydrated as this illness is self-resolving. Pt was also found to have anemia caused by iron deficiency and was told to follow up outpatient with her PCP about this.    On day of discharge, patient is clinically stable with no new exam findings or acute symptoms compared to prior. The patient was seen by the attending physician on the date of discharge and deemed stable and acceptable for discharge. The patient's chronic medical conditions were treated accordingly per the patient's home medication regimen. The patient's medication reconciliation (with changes made to chronic medications), follow up appointments, discharge orders, instructions, and significant lab and diagnostic studies are as noted.    Important Medication Changes and Reason:  None    Active or Pending Issues Requiring Follow-up:  None    Discharge Diagnoses:  Salmonella gastroenteritis

## 2024-08-26 NOTE — DISCHARGE NOTE PROVIDER - NSDCCPCAREPLAN_GEN_ALL_CORE_FT
PRINCIPAL DISCHARGE DIAGNOSIS  Diagnosis: Gastroenteritis  Assessment and Plan of Treatment: Salmonella gastroenteritis is an infection caused by the bacteria Salmonella, which leads to inflammation of the stomach and intestines. It commonly results from consuming contaminated food or water. Symptoms typically include diarrhea, abdominal cramps, nausea, vomiting and sometimes fevers. The illness usually resolves on its own within a week in young people with no other illnesses. Treatment generally focuses on supportive care, which we did for you. Make sure to stay hydrated and take rest.      SECONDARY DISCHARGE DIAGNOSES  Diagnosis: Anemia  Assessment and Plan of Treatment: When we worked you up for the diarrhea, we found that you are also anemic, which means there is a deficiency in the number of quality of red blood cells or hemoglobin in your blood. Hemoglobin is the protein in red blood cells that carries oxygen throughout the body. When anemia occurs, this can lead to fatigue and weakness. We found that your anemia is likely due to iron deficiency. Please follow up with your primary care physician outpatient to discuss this and what can be done for it.

## 2024-08-26 NOTE — DISCHARGE NOTE PROVIDER - NSFOLLOWUPCLINICS_GEN_ALL_ED_FT
[facility]  Internal Medicine  [address] [place] [address]  Phone: [phone]  Fax: [phone]  Follow Up Time: 2 weeks

## 2024-08-26 NOTE — DISCHARGE NOTE PROVIDER - ATTENDING DISCHARGE PHYSICAL EXAMINATION:
VITAL SIGNS:  T(C): 36.6 (08-27-24 @ 07:02), Max: 36.7 (08-26-24 @ 12:16)  T(F): 97.9 (08-27-24 @ 07:02), Max: 98.1 (08-26-24 @ 21:38)  HR: 87 (08-27-24 @ 07:02) (76 - 96)  BP: 115/60 (08-27-24 @ 07:02) (102/72 - 115/69)  BP(mean): --  RR: 18 (08-27-24 @ 07:02) (18 - 18)  SpO2: 100% (08-27-24 @ 07:02) (98% - 100%)  Wt(kg): --    PHYSICAL EXAM:  Constitutional: resting comfortably in bed; NAD  Head: NC/AT  Eyes: PERRL, EOMI, anicteric sclera  ENT: no nasal discharge; MMM  Neck: supple; no JVD  Respiratory: CTA B/L; no W/R/R  Cardiac: +S1/S2; RRR; no M/R/G  Gastrointestinal: soft, NT/ND; no rebound or guarding; +BSx4  Extremities: WWP, no clubbing or cyanosis; no peripheral edema  Musculoskeletal: NROM x4; no joint swelling, tenderness or erythema  Vascular: 2+ radial, DP/PT pulses B/L  Dermatologic: skin warm, dry and intact; no rashes, wounds, or scars  Neurologic: AAOx3; CNII-XII grossly intact; no focal deficits  Psychiatric: affect and characteristics of appearance, verbalizations, behaviors are appropriate

## 2024-08-26 NOTE — DISCHARGE NOTE PROVIDER - NSDCCPTREATMENT_GEN_ALL_CORE_FT
PRINCIPAL PROCEDURE  Procedure: CT scan  Findings and Treatment: PROCEDURE:  CT of the Abdomen and Pelvis was performed.  Sagittal and coronal reformats were performed.  FINDINGS:  LOWER CHEST: Within normal limits.  LIVER: Within normal limits.  BILE DUCTS: Normal caliber.  GALLBLADDER: Within normal limits.  SPLEEN: Within normal limits.  PANCREAS: Within normal limits.  ADRENALS: Within normal limits.  KIDNEYS/URETERS: Within normal limits. Symmetric enhancement. No   collecting system obstruction bilaterally.  BLADDER: Within normal limits.  REPRODUCTIVE ORGANS: Within normal limits  BOWEL: Diffuse abnormal colonic wall thickening is noted. It is   particularly severein the descending and the sigmoid colon. Findings are   compatible with a pancolitis. The terminal ileum is normal. No bowel   obstruction. No diverticulitis., Appendix is normal.  PERITONEUM/RETROPERITONEUM: Within normal limits.  VESSELS: Within normal limits.  LYMPH NODES: No lymphadenopathy.  ABDOMINAL WALL: Within normal limits.  BONES: Within normal limits.  IMPRESSION:  Diffuse abnormal colonic wall thickening, particular severe in the   descending and sigmoid colon, findings compatible with a pancolitis. This   is likely of inflammatory or infectious etiology.

## 2024-08-26 NOTE — PROGRESS NOTE ADULT - SUBJECTIVE AND OBJECTIVE BOX
****Jen Yuriyyaminilucia Internal Medicine PGY-1*****    CHIEF COMPLAINT:    Overnight Events: No acute overnight events  Interval Events:     OBJECTIVE:  ICU Vital Signs Last 24 Hrs  T(C): 36.6 (26 Aug 2024 06:04), Max: 37.2 (25 Aug 2024 17:15)  T(F): 97.9 (26 Aug 2024 06:04), Max: 99 (25 Aug 2024 17:15)  HR: 81 (26 Aug 2024 06:04) (73 - 97)  BP: 108/62 (26 Aug 2024 06:04) (107/71 - 127/72)  BP(mean): --  ABP: --  ABP(mean): --  RR: 20 (26 Aug 2024 06:04) (18 - 20)  SpO2: 100% (26 Aug 2024 06:04) (100% - 100%)    O2 Parameters below as of 26 Aug 2024 06:04  Patient On (Oxygen Delivery Method): room air              CAPILLARY BLOOD GLUCOSE          PHYSICAL EXAM  CONSTITUTIONAL: Well groomed, no apparent distress  EYES: EOMI, No conjunctival or scleral injection, non-icteric  ENMT: Oral mucosa with moist membranes. Normal dentition; no pharyngeal injection or exudates  RESP: No respiratory distress, no use of accessory muscles; CTA b/l, no WRR  CV: RRR, +S1S2, no JVD; no peripheral edema  GI: Soft, mild tenderness to palpation diffusely, negative Ellis's sign, ND, no rebound, no guarding; no palpable masses; no hepatosplenomegaly  MSK: Normal ROM without pain, normal muscle strength/tone  SKIN: No rashes or ulcers noted; no subcutaneous nodules or induration palpable  NEURO: CN II-XII intact; normal reflexes in upper and lower extremities, sensation intact in upper and lower extremities b/l to light touch   PSYCH: Appropriate insight/judgment; A+O x 3, mood and affect appropriate, recent/remote memory intact       HOSPITAL MEDICATIONS:  MEDICATIONS  (STANDING):  chlorhexidine 2% Cloths 1 Application(s) Topical daily  enoxaparin Injectable 40 milliGRAM(s) SubCutaneous every 24 hours  lactated ringers. 1000 milliLiter(s) (100 mL/Hr) IV Continuous <Continuous>    MEDICATIONS  (PRN):  acetaminophen     Tablet .. 650 milliGRAM(s) Oral every 6 hours PRN Temp greater or equal to 38C (100.4F), Mild Pain (1 - 3)  aluminum hydroxide/magnesium hydroxide/simethicone Suspension 30 milliLiter(s) Oral every 4 hours PRN Dyspepsia  melatonin 3 milliGRAM(s) Oral at bedtime PRN Insomnia  ondansetron Injectable 4 milliGRAM(s) IV Push every 8 hours PRN Nausea and/or Vomiting      LABS:                        11.4   5.59  )-----------( 321      ( 25 Aug 2024 11:05 )             32.9     Hgb Trend: 11.4<--  08-25    136  |  99  |  10  ----------------------------<  94  3.4<L>   |  21<L>  |  0.82    Ca    8.9      25 Aug 2024 11:05  Phos  2.9     08-25  Mg     1.80     08-25    TPro  8.3  /  Alb  4.1  /  TBili  0.3  /  DBili  x   /  AST  17  /  ALT  14  /  AlkPhos  79  08-25    Creatinine Trend: 0.82<--    Urinalysis Basic - ( 25 Aug 2024 11:05 )    Color: x / Appearance: x / SG: x / pH: x  Gluc: 94 mg/dL / Ketone: x  / Bili: x / Urobili: x   Blood: x / Protein: x / Nitrite: x   Leuk Esterase: x / RBC: x / WBC x   Sq Epi: x / Non Sq Epi: x / Bacteria: x        Venous Blood Gas:  08-25 @ 11:05  7.41/39/43/25/76.5  VBG Lactate: 1.0      MICROBIOLOGY:       RADIOLOGY:  [ ] Reviewed by me   ****Jen Jarod Internal Medicine PGY-1*****    CHIEF COMPLAINT:    Overnight Events: No acute overnight events  Interval Events: Had 8 watery BM yesterday and 2 this morning. They are becoming less watery and more solidified. Abdominal pain slightly better. Denies blood in stool, vomiting.     OBJECTIVE:  ICU Vital Signs Last 24 Hrs  T(C): 36.6 (26 Aug 2024 06:04), Max: 37.2 (25 Aug 2024 17:15)  T(F): 97.9 (26 Aug 2024 06:04), Max: 99 (25 Aug 2024 17:15)  HR: 81 (26 Aug 2024 06:04) (73 - 97)  BP: 108/62 (26 Aug 2024 06:04) (107/71 - 127/72)  BP(mean): --  ABP: --  ABP(mean): --  RR: 20 (26 Aug 2024 06:04) (18 - 20)  SpO2: 100% (26 Aug 2024 06:04) (100% - 100%)    O2 Parameters below as of 26 Aug 2024 06:04  Patient On (Oxygen Delivery Method): room air              CAPILLARY BLOOD GLUCOSE          PHYSICAL EXAM  CONSTITUTIONAL: Well groomed, no apparent distress  EYES: EOMI, No conjunctival or scleral injection, non-icteric  ENMT: Oral mucosa with moist membranes. Normal dentition; no pharyngeal injection or exudates  RESP: No respiratory distress, no use of accessory muscles; CTA b/l, no WRR  CV: RRR, +S1S2, no JVD; no peripheral edema  GI: Soft, mild tenderness to palpation diffusely, negative Ellis's sign, ND, no rebound, no guarding; no palpable masses; no hepatosplenomegaly  MSK: Normal ROM without pain, normal muscle strength/tone  SKIN: No rashes or ulcers noted; no subcutaneous nodules or induration palpable  NEURO: CN II-XII intact; normal reflexes in upper and lower extremities, sensation intact in upper and lower extremities b/l to light touch   PSYCH: Appropriate insight/judgment; A+O x 3, mood and affect appropriate, recent/remote memory intact       HOSPITAL MEDICATIONS:  MEDICATIONS  (STANDING):  chlorhexidine 2% Cloths 1 Application(s) Topical daily  enoxaparin Injectable 40 milliGRAM(s) SubCutaneous every 24 hours  lactated ringers. 1000 milliLiter(s) (100 mL/Hr) IV Continuous <Continuous>    MEDICATIONS  (PRN):  acetaminophen     Tablet .. 650 milliGRAM(s) Oral every 6 hours PRN Temp greater or equal to 38C (100.4F), Mild Pain (1 - 3)  aluminum hydroxide/magnesium hydroxide/simethicone Suspension 30 milliLiter(s) Oral every 4 hours PRN Dyspepsia  melatonin 3 milliGRAM(s) Oral at bedtime PRN Insomnia  ondansetron Injectable 4 milliGRAM(s) IV Push every 8 hours PRN Nausea and/or Vomiting      LABS:                        11.4   5.59  )-----------( 321      ( 25 Aug 2024 11:05 )             32.9     Hgb Trend: 11.4<--  08-25    136  |  99  |  10  ----------------------------<  94  3.4<L>   |  21<L>  |  0.82    Ca    8.9      25 Aug 2024 11:05  Phos  2.9     08-25  Mg     1.80     08-25    TPro  8.3  /  Alb  4.1  /  TBili  0.3  /  DBili  x   /  AST  17  /  ALT  14  /  AlkPhos  79  08-25    Creatinine Trend: 0.82<--    Urinalysis Basic - ( 25 Aug 2024 11:05 )    Color: x / Appearance: x / SG: x / pH: x  Gluc: 94 mg/dL / Ketone: x  / Bili: x / Urobili: x   Blood: x / Protein: x / Nitrite: x   Leuk Esterase: x / RBC: x / WBC x   Sq Epi: x / Non Sq Epi: x / Bacteria: x        Venous Blood Gas:  08-25 @ 11:05  7.41/39/43/25/76.5  VBG Lactate: 1.0      MICROBIOLOGY:       RADIOLOGY:  [ ] Reviewed by me

## 2024-08-26 NOTE — DISCHARGE NOTE PROVIDER - CARE PROVIDER_API CALL
[facility]  [id number] [place] [id number]  Phone: [phone]  Fax: [phone]  Established Patient  Follow Up Time: 2 weeks

## 2024-08-27 ENCOUNTER — TRANSCRIPTION ENCOUNTER (OUTPATIENT)
Age: 29
End: 2024-08-27

## 2024-08-27 VITALS
DIASTOLIC BLOOD PRESSURE: 60 MMHG | TEMPERATURE: 98 F | HEART RATE: 87 BPM | OXYGEN SATURATION: 100 % | SYSTOLIC BLOOD PRESSURE: 115 MMHG | RESPIRATION RATE: 18 BRPM

## 2024-08-27 LAB
CULTURE RESULTS: ABNORMAL
SPECIMEN SOURCE: SIGNIFICANT CHANGE UP

## 2024-08-27 PROCEDURE — 99239 HOSP IP/OBS DSCHRG MGMT >30: CPT

## 2024-08-27 NOTE — DISCHARGE NOTE NURSING/CASE MANAGEMENT/SOCIAL WORK - PATIENT PORTAL LINK FT
You can access the FollowMyHealth Patient Portal offered by [facility] by registering at the following website: [email or web address]. By joining [employer]’s FollowMyHealth portal, you will also be able to view your health information using other applications (apps) compatible with our system.

## 2024-08-27 NOTE — PROGRESS NOTE ADULT - SUBJECTIVE AND OBJECTIVE BOX
****Jen Jarod Internal Medicine PGY-1*****    CHIEF COMPLAINT:    Overnight Events: No acute overnight events  Interval Events: BM becoming less watery and more solidified. Abdominal pain slightly better. Denies blood in stool, vomiting.     OBJECTIVE:  ICU Vital Signs Last 24 Hrs  T(C): 36.6 (26 Aug 2024 06:04), Max: 37.2 (25 Aug 2024 17:15)  T(F): 97.9 (26 Aug 2024 06:04), Max: 99 (25 Aug 2024 17:15)  HR: 81 (26 Aug 2024 06:04) (73 - 97)  BP: 108/62 (26 Aug 2024 06:04) (107/71 - 127/72)  BP(mean): --  ABP: --  ABP(mean): --  RR: 20 (26 Aug 2024 06:04) (18 - 20)  SpO2: 100% (26 Aug 2024 06:04) (100% - 100%)    O2 Parameters below as of 26 Aug 2024 06:04  Patient On (Oxygen Delivery Method): room air              CAPILLARY BLOOD GLUCOSE          PHYSICAL EXAM  CONSTITUTIONAL: Well groomed, no apparent distress  EYES: EOMI, No conjunctival or scleral injection, non-icteric  ENMT: Oral mucosa with moist membranes. Normal dentition; no pharyngeal injection or exudates  RESP: No respiratory distress, no use of accessory muscles; CTA b/l, no WRR  CV: RRR, +S1S2, no JVD; no peripheral edema  GI: Soft, mild tenderness to palpation diffusely, negative Ellis's sign, ND, no rebound, no guarding; no palpable masses; no hepatosplenomegaly  MSK: Normal ROM without pain, normal muscle strength/tone  SKIN: No rashes or ulcers noted; no subcutaneous nodules or induration palpable  NEURO: CN II-XII intact; normal reflexes in upper and lower extremities, sensation intact in upper and lower extremities b/l to light touch   PSYCH: Appropriate insight/judgment; A+O x 3, mood and affect appropriate, recent/remote memory intact       HOSPITAL MEDICATIONS:  MEDICATIONS  (STANDING):  chlorhexidine 2% Cloths 1 Application(s) Topical daily  enoxaparin Injectable 40 milliGRAM(s) SubCutaneous every 24 hours  lactated ringers. 1000 milliLiter(s) (100 mL/Hr) IV Continuous <Continuous>    MEDICATIONS  (PRN):  acetaminophen     Tablet .. 650 milliGRAM(s) Oral every 6 hours PRN Temp greater or equal to 38C (100.4F), Mild Pain (1 - 3)  aluminum hydroxide/magnesium hydroxide/simethicone Suspension 30 milliLiter(s) Oral every 4 hours PRN Dyspepsia  melatonin 3 milliGRAM(s) Oral at bedtime PRN Insomnia  ondansetron Injectable 4 milliGRAM(s) IV Push every 8 hours PRN Nausea and/or Vomiting      LABS:                        11.4   5.59  )-----------( 321      ( 25 Aug 2024 11:05 )             32.9     Hgb Trend: 11.4<--  08-25    136  |  99  |  10  ----------------------------<  94  3.4<L>   |  21<L>  |  0.82    Ca    8.9      25 Aug 2024 11:05  Phos  2.9     08-25  Mg     1.80     08-25    TPro  8.3  /  Alb  4.1  /  TBili  0.3  /  DBili  x   /  AST  17  /  ALT  14  /  AlkPhos  79  08-25    Creatinine Trend: 0.82<--    Urinalysis Basic - ( 25 Aug 2024 11:05 )    Color: x / Appearance: x / SG: x / pH: x  Gluc: 94 mg/dL / Ketone: x  / Bili: x / Urobili: x   Blood: x / Protein: x / Nitrite: x   Leuk Esterase: x / RBC: x / WBC x   Sq Epi: x / Non Sq Epi: x / Bacteria: x        Venous Blood Gas:  08-25 @ 11:05  7.41/39/43/25/76.5  VBG Lactate: 1.0      MICROBIOLOGY:       RADIOLOGY:  [ ] Reviewed by me

## 2024-08-27 NOTE — PROGRESS NOTE ADULT - ATTENDING COMMENTS
Pt is a 30 yo F with no PMH p/w abd pain and diarrhea after eating leftover Chinese food. Hemodynamically stable on arrival with labs showing 17.9% bands, WALTER, C. diff neg, and GI PCR+ salmonella. CT a/p with pancolitis. Received zosyn in the ER, now discontinued and receiving supportive therapy. Pt seen and examined at bedside resting comfortably, BMs decreasing in quantity and more formed. Feels comfortable going home today. Discussed that she should not take immodium as her body needs to clear the salmonella -- expressed understanding. Encouraged continued PO intake and drinking Gatorade/pedialyte/Powerade. Exam benign. Discussed with HS, rest as outlined above.
Pt is a 28 yo F with no PMH p/w abd pain and diarrhea after eating leftover Chinese food. Hemodynamically stable on arrival with labs showing 17.9% bands, WALTER, C. diff neg, and GI PCR+ salmonella. CT a/p with pancolitis. Received zosyn in the ER, now discontinued and receiving supportive therapy. Pt seen and examined at bedside resting comfortably, still with significant number of BMs but states they are starting to become more composed. As of lunch time she had already had 6-7 BMs. She would like to go home as soon as she can but feels she needs to stay overnight to ensure symptoms continue to improve. Discussed that she should not take immodium as her body needs to clear the salmonella -- expressed understanding. Will plan to monitor today. Encouraged PO intake and replacing the cranberry juice she is drinking with Gatorade/pedialyte/Powerade. Exam benign. Discussed with HS, rest as outlined above.

## 2024-08-27 NOTE — PROGRESS NOTE ADULT - ASSESSMENT
Pt is a 29 year old female with no significant past medical history presenting with watery diarrhea and abdominal pain associated with myalgias in the setting of Salmonella gastroenteritis. She is hemodynamically stable and afebrile with labs notable for bandemia and CT findings notable for pancolitis.
Pt is a 29 year old female with no significant past medical history presenting with watery diarrhea and abdominal pain associated with myalgias in the setting of Salmonella gastroenteritis. She is hemodynamically stable and afebrile with labs notable for bandemia and CT findings notable for pancolitis.

## 2024-08-27 NOTE — PROGRESS NOTE ADULT - TIME BILLING
review of laboratory data, radiology results, consultants' recommendations, documentation in [place], discussion with patient/ACP and interdisciplinary staff (such as , social workers, etc). Interventions were performed as documented above.
review of laboratory data, radiology results, consultants' recommendations, documentation in [place], discussion with patient/ACP and interdisciplinary staff (such as , social workers, etc). Interventions were performed as documented above.

## 2024-08-27 NOTE — PROGRESS NOTE ADULT - PROBLEM SELECTOR PLAN 1
Likely in the setting of viral or bacterial etiology. Labs notable for bandemia. CT abd/pelvis findings notable for diffuse abnormal colonic wall thickening, suggesting an inflammatory or infectious etiology. Less likely to be IBD, diverticulitis, appendicitis, gynecological etiology due to CT findings. Tested positive for Salmonella    Plan:  - cc/hr for 12 hours  -replete electrolytes as needed
Likely in the setting of viral or bacterial etiology. Labs notable for bandemia. CT abd/pelvis findings notable for diffuse abnormal colonic wall thickening, suggesting an inflammatory or infectious etiology. Less likely to be IBD, diverticulitis, appendicitis, gynecological etiology due to CT findings. Tested positive for Salmonella    Plan:  - cc/hr for 12 hours  -replete electrolytes as needed

## 2024-08-27 NOTE — PROGRESS NOTE ADULT - PROBLEM SELECTOR PLAN 2
Microcytic anemia likely in the setting of iron deficiency    -Iron studies
Microcytic anemia likely in the setting of iron deficiency    -Iron studies

## 2024-08-30 LAB
CULTURE RESULTS: SIGNIFICANT CHANGE UP
CULTURE RESULTS: SIGNIFICANT CHANGE UP
SPECIMEN SOURCE: SIGNIFICANT CHANGE UP
SPECIMEN SOURCE: SIGNIFICANT CHANGE UP

## 2024-09-06 NOTE — CHART NOTE - NSCHARTNOTEFT_GEN_A_CORE
9/6/24-Patient was outreached but did not answer. A voicemail was left for the patient to return our call.

## 2025-02-10 NOTE — ED ADULT TRIAGE NOTE - WEIGHT IN KG
Please reach out to family to schedule this F/U appt around 8/28/2025 for ECHO, EKG, HOLTER.   102.1
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
